# Patient Record
Sex: MALE | Race: WHITE | Employment: OTHER | URBAN - METROPOLITAN AREA
[De-identification: names, ages, dates, MRNs, and addresses within clinical notes are randomized per-mention and may not be internally consistent; named-entity substitution may affect disease eponyms.]

---

## 2017-02-06 ENCOUNTER — OFFICE VISIT (OUTPATIENT)
Dept: FAMILY MEDICINE | Facility: CLINIC | Age: 72
End: 2017-02-06
Payer: COMMERCIAL

## 2017-02-06 VITALS — RESPIRATION RATE: 18 BRPM | BODY MASS INDEX: 28.17 KG/M2 | WEIGHT: 188 LBS | TEMPERATURE: 96.7 F

## 2017-02-06 DIAGNOSIS — K64.4 EXTERNAL HEMORRHOIDS: ICD-10-CM

## 2017-02-06 DIAGNOSIS — R51.9 NONINTRACTABLE HEADACHE, UNSPECIFIED CHRONICITY PATTERN, UNSPECIFIED HEADACHE TYPE: Primary | ICD-10-CM

## 2017-02-06 PROCEDURE — 99214 OFFICE O/P EST MOD 30 MIN: CPT | Performed by: FAMILY MEDICINE

## 2017-02-06 RX ORDER — TADALAFIL 10 MG/1
TABLET ORAL DAILY PRN
COMMUNITY

## 2017-02-06 NOTE — MR AVS SNAPSHOT
After Visit Summary   2/6/2017    Karlos Wood    MRN: [id number]           Patient Information     Date Of Birth          1945        Visit Information        Provider Department      2/6/2017 7:40 AM Terence Thornton MD [facility]        Today's Diagnoses     Nonintractable headache, unspecified chronicity pattern, unspecified headache type    -  1     External hemorrhoids           Care Instructions          Thank you for choosing [facility].  You may be receiving a survey in the mail from [facility] regarding your visit today.  Please take a few minutes to complete and return the survey to let us know how we are doing.      Our Clinic hours are:  Mondays    7:20 am - 7 pm  Tues -  Fri  7:20 am - 5 pm    Clinic Phone: [phone]    The clinic lab opens at 7:30 am Mon - Fri and appointments are required.    [facility]  Ph. [phone]  Monday-Thursday 8 am - 7pm  Tues/Wed/Fri 8 am - 5:30 pm               Follow-ups after your visit        Future tests that were ordered for you today     Open Future Orders        Priority Expected Expires Ordered    CT Sinus w/o Contrast Routine  2/6/2018 2/6/2017            Who to contact     If you have questions or need follow up information about today's clinic visit or your schedule please contact [facility] directly at [phone].  Normal or non-critical lab and imaging results will be communicated to you by MyChart, letter or phone within 4 business days after the clinic has received the results. If you do not hear from us within 7 days, please contact the clinic through MyChart or phone. If you have a critical or abnormal lab result, we will notify you by phone as soon as possible.  Submit refill requests through [employer] or call your pharmacy and they will forward the refill request to us. Please allow 3 business days for your refill to be completed.          Additional Information  "About Your Visit        [employer] Information     Giiv lets you send messages to your doctor, view your test results, renew your prescriptions, schedule appointments and more. To sign up, go to [email or web address] . Click on \"Log in\" on the left side of the screen, which will take you to the Welcome page. Then click on \"Sign up Now\" on the right side of the page.     You will be asked to enter the access code listed below, as well as some personal information. Please follow the directions to create your username and password.     Your access code is: [email or web address]  Expires: 3/7/2017  2:16 PM     Your access code will  in 90 days. If you need help or a new code, please call your [place] clinic or [phone].        Care EveryWhere ID     This is your Care EveryWhere ID. This could be used by other organizations to access your [place] medical records  [id number]        Your Vitals Were     Temperature Respirations                96.7  F (35.9  C) 18           Blood Pressure from Last 3 Encounters:   16 116/75   16 116/71   16 141/83    Weight from Last 3 Encounters:   17 188 lb (85.276 kg)   16 189 lb (85.73 kg)   16 182 lb (82.555 kg)                 Today's Medication Changes          These changes are accurate as of: 17  8:32 AM.  If you have any questions, ask your nurse or doctor.               Start taking these medicines.        Dose/Directions    hydrocortisone 2.5 % cream   Commonly known as:  ANUSOL-HC   Used for:  External hemorrhoids   Started by:  Terence Thornton MD        Place rectally 2 times daily   Quantity:  30 g   Refills:  0         Stop taking these medicines if you haven't already. Please contact your care team if you have questions.     VIAGRA 100 MG cap/tab   Generic drug:  sildenafil   Stopped by:  Terence Thornton MD                Where to get your medicines      These medications were sent to [facility] - " [facility] - [address]  [address], [place] [address]    Hours:  [facility] [place] Phone:  [phone]    - hydrocortisone 2.5 % cream             Primary Care Provider Office Phone # Fax #    Terence Thornton [phone]       [facility] [address]  [facility] [address]        Thank you!     Thank you for choosing [facility]  for your care. Our goal is always to provide you with excellent care. Hearing back from our patients is one way we can continue to improve our services. Please take a few minutes to complete the written survey that you may receive in the mail after your visit with us. Thank you!             Your Updated Medication List - Protect others around you: Learn how to safely use, store and throw away your medicines at www.disposemymeds.org.          This list is accurate as of: 2/6/17  8:32 AM.  Always use your most recent med list.                   Brand Name Dispense Instructions for use    BENADRYL 25 MG tablet   Generic drug:  diphenhydrAMINE     56    1 TABLET EVERY 4 TO 6 HOURS AS NEEDED       CIALIS 10 MG tablet   Generic drug:  tadalafil      Take by mouth daily as needed for erectile dysfunction       diflorasone 0.05 % ointment    PSORCON    30 g    Apply to Leg eczema once daily as needed.       hydrocortisone 2.5 % cream    ANUSOL-HC    30 g    Place rectally 2 times daily       IBUPROFEN PO      Take 400 mg by mouth every 6 hours as needed for moderate pain       MICONAZOLE      applying to toes PRN       simvastatin 20 MG tablet    ZOCOR    90 tablet    Take 1 tablet (20 mg) by mouth At Bedtime       TYLENOL PO      Take 500 mg by mouth

## 2017-02-06 NOTE — PATIENT INSTRUCTIONS
Thank you for choosing [facility].  You may be receiving a survey in the mail from [facility] regarding your visit today.  Please take a few minutes to complete and return the survey to let us know how we are doing.      Our Clinic hours are:  Mondays    7:20 am - 7 pm  Tues -  Fri  7:20 am - 5 pm    Clinic Phone: [phone]    The clinic lab opens at 7:30 am Mon - Fri and appointments are required.    [facility]. [phone]  Monday-Thursday 8 am - 7pm  Tues/Wed/Fri 8 am - 5:30 pm

## 2017-02-06 NOTE — PROGRESS NOTES
SUBJECTIVE:                                                    Karlos Wood is a 71 year old male who presents to clinic today for the following health issues:      Headache     Onset: 12/16     Description:   Location: bilateral in the frontal area, bilateral in the temporal area   Character: throbbing pain  Frequency:  Daily   Duration:  All day     Intensity: mild    Progression of Symptoms:  same    Accompanying Signs & Symptoms:  Stiff neck: no  Neck or upper back pain: no  Fever: no  Sinus pressure: YES  Nausea or vomiting: no  Dizziness: no  Numbness: no  Weakness: no  Visual changes: no   History:   Head trauma: no  Family history of migraines: no  Previous tests for headaches: no  Neurologist evaluations: no  Able to do daily activities: YES  Wake with a headaches: no  Do headaches wake you up: no  Daily pain medication use: YES  Work/school stressors/changes: YES- selling house     Precipitating factors:   Does light make it worse: no  Does sound make it worse: no    Alleviating factors:  Does sleep help: YES         Therapies Tried and outcome: Naproxyn (Aleve), Tylenol and narcotics ( codeine )- helps -  Sinus medication did not help           Problem list and histories reviewed & adjusted, as indicated.  Additional history: as documented    Further history obtained, clarified or corrected by physician:  He continues to have frontal headaches that have varied in quality and severity but really have not disappeared over the last 2 months. There are times he thinks this is sinus infection because there is congestion but other times is just sharp frontal headaches. He has been treated for sinusitis without much relief.    OBJECTIVE:  HEAD: AT/NC  EYES: PERRLA, EOMI, Sclerae clear, Fundi normal with sharp discs  EARS: TMs clear, canals normal  NOSE & THROAT: clear  NEURO: Alert and oriented X 3, non focal exam, DTRs normal, motor and sensation normal, coordination and gait without  abnormality.    ASSESSMENT:     Nonintractable headache, unspecified chronicity pattern, unspecified headache type  External hemorrhoids    PLAN:  Orders Placed This Encounter     CT Sinus w/o Contrast     tadalafil (CIALIS) 10 MG tablet     hydrocortisone (ANUSOL-HC) 2.5 % cream     CT scan of the sinuses, if this is negative then he may need MRI scan of the brain and possibly neurology consultation.

## 2017-02-07 ENCOUNTER — TELEPHONE (OUTPATIENT)
Dept: FAMILY MEDICINE | Facility: CLINIC | Age: 72
End: 2017-02-07

## 2017-02-07 ENCOUNTER — HOSPITAL ENCOUNTER (OUTPATIENT)
Dept: CT IMAGING | Facility: CLINIC | Age: 72
Discharge: HOME | End: 2017-02-07
Attending: FAMILY MEDICINE | Admitting: FAMILY MEDICINE
Payer: MEDICARE

## 2017-02-07 DIAGNOSIS — R51.9 NONINTRACTABLE HEADACHE, UNSPECIFIED CHRONICITY PATTERN, UNSPECIFIED HEADACHE TYPE: ICD-10-CM

## 2017-02-07 DIAGNOSIS — G44.209 TENSION-TYPE HEADACHE, NOT INTRACTABLE, UNSPECIFIED CHRONICITY PATTERN: Primary | ICD-10-CM

## 2017-02-07 PROCEDURE — 70486 CT MAXILLOFACIAL W/O DYE: CPT

## 2017-02-07 NOTE — TELEPHONE ENCOUNTER
Patient calling needing the order for his MRI to be faxed. Order faxed to [phone].    [facility]

## 2017-02-16 ENCOUNTER — OFFICE VISIT (OUTPATIENT)
Dept: FAMILY MEDICINE | Facility: CLINIC | Age: 72
End: 2017-02-16
Payer: COMMERCIAL

## 2017-02-16 VITALS
SYSTOLIC BLOOD PRESSURE: 138 MMHG | WEIGHT: 188 LBS | DIASTOLIC BLOOD PRESSURE: 82 MMHG | HEIGHT: 69 IN | BODY MASS INDEX: 27.85 KG/M2 | HEART RATE: 62 BPM | TEMPERATURE: 97 F

## 2017-02-16 DIAGNOSIS — J34.89 SINUS DRAINAGE: ICD-10-CM

## 2017-02-16 DIAGNOSIS — R50.81 FEVER IN OTHER DISEASES: ICD-10-CM

## 2017-02-16 DIAGNOSIS — R51.9 DAILY HEADACHE: ICD-10-CM

## 2017-02-16 DIAGNOSIS — J10.1 INFLUENZA A: Primary | ICD-10-CM

## 2017-02-16 LAB
FLUAV+FLUBV AG SPEC QL: ABNORMAL
FLUAV+FLUBV AG SPEC QL: ABNORMAL
SPECIMEN SOURCE: ABNORMAL

## 2017-02-16 PROCEDURE — 87804 INFLUENZA ASSAY W/OPTIC: CPT | Performed by: FAMILY MEDICINE

## 2017-02-16 PROCEDURE — 99213 OFFICE O/P EST LOW 20 MIN: CPT | Performed by: FAMILY MEDICINE

## 2017-02-16 RX ORDER — FLUTICASONE PROPIONATE 50 MCG
1-2 SPRAY, SUSPENSION (ML) NASAL DAILY
Qty: 1 BOTTLE | Refills: 0 | Status: SHIPPED | OUTPATIENT
Start: 2017-02-16 | End: 2018-02-08

## 2017-02-16 NOTE — MR AVS SNAPSHOT
"              After Visit Summary   2017    Karlos Wood    MRN: [id number]           Patient Information     Date Of Birth          1945        Visit Information        Provider Department      2017 11:20 AM Airam Pena MD [facility]        Today's Diagnoses     Fever in other diseases    -  1    Sinus drainage        Daily headache           Follow-ups after your visit        Who to contact     If you have questions or need follow up information about today's clinic visit or your schedule please contact [facility] directly at [phone].  Normal or non-critical lab and imaging results will be communicated to you by [employer], letter or phone within 4 business days after the clinic has received the results. If you do not hear from us within 7 days, please contact the clinic through [employer] or phone. If you have a critical or abnormal lab result, we will notify you by phone as soon as possible.  Submit refill requests through [employer] or call your pharmacy and they will forward the refill request to us. Please allow 3 business days for your refill to be completed.          Additional Information About Your Visit        MyChart Information     [employer] lets you send messages to your doctor, view your test results, renew your prescriptions, schedule appointments and more. To sign up, go to [email or web address] . Click on \"Log in\" on the left side of the screen, which will take you to the Welcome page. Then click on \"Sign up Now\" on the right side of the page.     You will be asked to enter the access code listed below, as well as some personal information. Please follow the directions to create your username and password.     Your access code is: [email or web address]  Expires: 3/7/2017  2:16 PM     Your access code will  in 90 days. If you need help or a new code, please call your [facility] or [phone].        Care EveryWhere ID     This is " "your Care EveryWhere ID. This could be used by other organizations to access your [place] medical records  [id number]        Your Vitals Were     Pulse Temperature Height BMI (Body Mass Index)          62 97  F (36.1  C) (Tympanic) 5' 8.5\" (1.74 m) 28.17 kg/m2         Blood Pressure from Last 3 Encounters:   02/16/17 138/82   12/07/16 116/75   07/19/16 116/71    Weight from Last 3 Encounters:   02/16/17 188 lb (85.3 kg)   02/06/17 188 lb (85.3 kg)   12/07/16 189 lb (85.7 kg)              We Performed the Following     Influenza A/B antigen          Today's Medication Changes          These changes are accurate as of: 2/16/17 11:56 AM.  If you have any questions, ask your nurse or doctor.               Start taking these medicines.        Dose/Directions    fluticasone 50 MCG/ACT spray   Commonly known as:  FLONASE   Used for:  Sinus drainage   Started by:  Airam Pena MD        Dose:  1-2 spray   Spray 1-2 sprays into both nostrils daily   Quantity:  1 Bottle   Refills:  0            Where to get your medicines      These medications were sent to [place] [facility] - [facility] - [address]  [address], [place] [address]    Hours:  [place] [facility] [facility] Phone:  [phone]     fluticasone 50 MCG/ACT spray                Primary Care Provider Office Phone # Fax #    Terence Thornton [phone]       [facility] [address] [facility] [address]        Thank you!     Thank you for choosing [facility]  for your care. Our goal is always to provide you with excellent care. Hearing back from our patients is one way we can continue to improve our services. Please take a few minutes to complete the written survey that you may receive in the mail after your visit with us. Thank you!             Your Updated Medication List - Protect others around you: Learn how to safely use, store and throw away your medicines at " www.disposemymeds.org.          This list is accurate as of: 2/16/17 11:56 AM.  Always use your most recent med list.                   Brand Name Dispense Instructions for use    BENADRYL 25 MG tablet   Generic drug:  diphenhydrAMINE     56    1 TABLET EVERY 4 TO 6 HOURS AS NEEDED       CIALIS 10 MG tablet   Generic drug:  tadalafil      Take by mouth daily as needed for erectile dysfunction       diflorasone 0.05 % ointment    PSORCON    30 g    Apply to Leg eczema once daily as needed.       fluticasone 50 MCG/ACT spray    FLONASE    1 Bottle    Spray 1-2 sprays into both nostrils daily       hydrocortisone 2.5 % cream    ANUSOL-HC    30 g    Place rectally 2 times daily       IBUPROFEN PO      Take 400 mg by mouth every 6 hours as needed for moderate pain       MICONAZOLE      applying to toes PRN       simvastatin 20 MG tablet    ZOCOR    90 tablet    Take 1 tablet (20 mg) by mouth At Bedtime       TYLENOL PO      Take 500 mg by mouth

## 2017-02-16 NOTE — NURSING NOTE
"Chief Complaint   Patient presents with     URI       Initial /82  Pulse 62  Temp 97  F (36.1  C) (Tympanic)  Ht 5' 8.5\" (1.74 m)  Wt 188 lb (85.3 kg)  BMI 28.17 kg/m2 Estimated body mass index is 28.17 kg/(m^2) as calculated from the following:    Height as of this encounter: 5' 8.5\" (1.74 m).    Weight as of this encounter: 188 lb (85.3 kg).  Medication Reconciliation: complete    "

## 2017-02-16 NOTE — PROGRESS NOTES
"  SUBJECTIVE:                                                    Karlos Wood is a 71 year old male who presents to clinic today for the following health issues:      ENT Symptoms             Symptoms: cc Present Absent Comment   Fever/Chills x x  102.7 on Sunday   Fatigue  x     Muscle Aches  x     Eye Irritation   x    Sneezing   x    Nasal Demario/Drg  x  A lot of drainage   Sinus Pressure/Pain x x     Loss of smell   x    Dental pain   x    Sore Throat   x    Swollen Glands   x    Ear Pain/Fullness   x    Cough  x  Productive at times   Wheeze   x    Chest Pain   x    Shortness of breath   x    Rash   x    Other  x  Intermittent dizziness atimes     Symptom duration:  5-6 days   Symptom severity:  moderate   Treatments tried:  OTC cold RX   Contacts:  None       Karlos Wood is a 71 year old male who has had a history of headaches, often in his forehead, other times in the back of his head, usually aching but sometimes sharp and lancing.  He has also had some chronic rhinitis and has been treated with antibiotics for sinusitis. Due to persistent pain he had a sinus CT which did not find anything of significance, and has been scheduled now for a MRI which he postponed due to his current illness.    About five days ago he developed a fever with chills, myalgias, and sore throat, and a couple days later got the cough he now complains of.  The fever has resolved. He is worried about sinus problems again.    He has been under more stress recently because he and his wife have sold their home, and are looking for a smaller home, but have been having difficulty finding what they want.    He admits to frequent use of Tylenol or ibuprofen for his headaches.. He has felt lightheaded with the current acute illness, but denies any other neurological symptoms, denies GI problems.    OBJECTIVE: /82  Pulse 62  Temp 97  F (36.1  C) (Tympanic)  Ht 5' 8.5\" (1.74 m)  Wt 188 lb (85.3 kg)  BMI 28.17 kg/m2    TMs are " without inflammation but appear slightly retracted.  Nose is clear.  Maxillary and frontal sinuses are nontender.  Throat is normal.  Neck is without adenopathy.  The lungs are clear without wheezes, rales, or rhonchi.    Results for orders placed or performed in visit on 02/16/17   Influenza A/B antigen   Result Value Ref Range    Influenza A/B Agn Specimen Nasopharyngeal     Influenza A (A) NEG     Positive   Test results must be correlated with clinical data. If necessary, results   should be confirmed by a molecular assay or viral culture.      Influenza B  NEG     Negative   Test results must be correlated with clinical data. If necessary, results   should be confirmed by a molecular assay or viral culture.       ASSESSMENT: influenza A       Chronic daily headache, possible combination of tension headache plus analgesic rebound headache    PLAN: It is too late for Tamiflu to be of benefit. He did have a flu shot this year, but may have been more ill if he had not have that.   He may continue Mucinex and OTC cough meds.   He was given patient information on chronic daily headache from Up to Date. The recommendation is not to use Tylenol more than twice a week or ibuprofen more than 15 days in a month.  He already has a MRI scheduled through his PCP so he may pursue that.   Push fluids and rest.    Airam Pena md

## 2017-02-20 ENCOUNTER — TELEPHONE (OUTPATIENT)
Dept: FAMILY MEDICINE | Facility: CLINIC | Age: 72
End: 2017-02-20

## 2017-02-20 NOTE — TELEPHONE ENCOUNTER
Discussed MRI test and that results would be discussed with pt/her.  Results would lead to answers and/or further testing.  [email or web address]

## 2017-02-21 ENCOUNTER — TRANSFERRED RECORDS (OUTPATIENT)
Dept: HEALTH INFORMATION MANAGEMENT | Facility: CLINIC | Age: 72
End: 2017-02-21

## 2017-02-24 ENCOUNTER — TELEPHONE (OUTPATIENT)
Dept: FAMILY MEDICINE | Facility: CLINIC | Age: 72
End: 2017-02-24

## 2017-02-24 NOTE — TELEPHONE ENCOUNTER
Reason for Call:  Request for results:    Name of test or procedure: MRI     Date of test of procedure: 2/22/17    Location of the test or procedure: [facility]     OK to leave the result message on voice mail or with a family member? YES    Phone number Patient can be reached at:  Other phone number:  [phone]    Additional comments: pt states he has requested the report be faxed to PCP so he can get results and recommendations   States they have been sent to clinic twice     Call taken on 2/24/2017 at 11:10 AM by Yareli Manning

## 2017-05-08 ENCOUNTER — TELEPHONE (OUTPATIENT)
Dept: FAMILY MEDICINE | Facility: CLINIC | Age: 72
End: 2017-05-08

## 2017-05-08 DIAGNOSIS — E78.5 HYPERLIPIDEMIA LDL GOAL <130: Primary | ICD-10-CM

## 2017-05-08 NOTE — TELEPHONE ENCOUNTER
Reason for Call: Request for an order or referral:    Order or referral being requested: Pt made an appt @ [facility] for fasting, yearly labs and orders need to be placed.  No need to call patient back, unless there are questions or problems.      Date needed: at your convenience    Has the patient been seen by the PCP for this problem? YES    Additional comments: any    Phone number Patient can be reached at:  Home number on file [phone] (home)    Best Time:  any    Can we leave a detailed message on this number?  YES    Call taken on 5/8/2017 at 9:04 AM by Shirlene Mayes

## 2017-05-09 DIAGNOSIS — E78.5 HYPERLIPIDEMIA LDL GOAL <130: ICD-10-CM

## 2017-05-09 LAB
CHOLEST SERPL-MCNC: 183 MG/DL
HDLC SERPL-MCNC: 61 MG/DL
LDLC SERPL CALC-MCNC: 105 MG/DL
NONHDLC SERPL-MCNC: 122 MG/DL
TRIGL SERPL-MCNC: 86 MG/DL

## 2017-05-09 PROCEDURE — 36415 COLL VENOUS BLD VENIPUNCTURE: CPT | Performed by: FAMILY MEDICINE

## 2017-05-09 PROCEDURE — 80061 LIPID PANEL: CPT | Performed by: FAMILY MEDICINE

## 2017-05-10 ENCOUNTER — OFFICE VISIT (OUTPATIENT)
Dept: FAMILY MEDICINE | Facility: CLINIC | Age: 72
End: 2017-05-10
Payer: COMMERCIAL

## 2017-05-10 VITALS
SYSTOLIC BLOOD PRESSURE: 136 MMHG | WEIGHT: 183 LBS | RESPIRATION RATE: 18 BRPM | HEIGHT: 68 IN | BODY MASS INDEX: 27.74 KG/M2 | TEMPERATURE: 97.3 F | HEART RATE: 59 BPM | DIASTOLIC BLOOD PRESSURE: 75 MMHG

## 2017-05-10 DIAGNOSIS — E78.5 HYPERLIPIDEMIA LDL GOAL <130: ICD-10-CM

## 2017-05-10 DIAGNOSIS — Z00.00 ROUTINE HISTORY AND PHYSICAL EXAMINATION OF ADULT: Primary | ICD-10-CM

## 2017-05-10 PROCEDURE — 99397 PER PM REEVAL EST PAT 65+ YR: CPT | Performed by: FAMILY MEDICINE

## 2017-05-10 RX ORDER — SIMVASTATIN 20 MG
20 TABLET ORAL AT BEDTIME
Qty: 90 TABLET | Refills: 3 | Status: SHIPPED | OUTPATIENT
Start: 2017-05-10 | End: 2018-03-19

## 2017-05-10 NOTE — PROGRESS NOTES
SUBJECTIVE:                                                            Karlos Wood is a 71 year old male who presents for Preventive Visit.      Are you in the first 12 months of your Medicare Part B coverage?  No    Healthy Habits:    Do you get at least three servings of calcium containing foods daily (dairy, green leafy vegetables, etc.)? yes    Amount of exercise or daily activities, outside of work: 3 day(s) per week    Problems taking medications regularly No    Medication side effects: No    Have you had an eye exam in the past two years? yes    Do you see a dentist twice per year? yes    Do you have sleep apnea, excessive snoring or daytime drowsiness?no    COGNITIVE SCREEN  1) Repeat 3 items (Banana, Sunrise, Chair)    2) Clock draw: NORMAL  3) 3 item recall: Recalls 3 objects  Results: 3 items recalled: COGNITIVE IMPAIRMENT LESS LIKELY    Mini-CogTM Copyright S Alexis. Licensed by the author for use in [facility]; reprinted with permission (hoa@[facility]). All rights reserved.                Reviewed and updated as needed this visit by clinical staff  Tobacco  Allergies  Meds         Reviewed and updated as needed this visit by Provider        Social History   Substance Use Topics     Smoking status: Former Smoker     Smokeless tobacco: Never Used     Alcohol use Yes      Comment: 1-2 roberto every other week       The patient does not drink >3 drinks per day nor >7 drinks per week.    Today's PHQ-2 Score:   PHQ-2 ( 1999 Pf[employer]) 5/10/2017 12/7/2016   Q1: Little interest or pleasure in doing things 0 0   Q2: Feeling down, depressed or hopeless 0 0   PHQ-2 Score 0 0       Do you feel safe in your environment - Yes    Do you have a Health Care Directive?: Yes: Advance Directive has been received and scanned.    Current providers sharing in care for this patient include:   Patient Care Team:  Terence Thornton MD as PCP - General      Hearing impairment: No    Ability to successfully perform  "activities of daily living: Yes, no assistance needed     Fall risk:  Fallen 2 or more times in the past year?: No  Any fall with injury in the past year?: No    Home safety:  none identified      The following health maintenance items are reviewed in Epic and correct as of today:  Health Maintenance   Topic Date Due     AORTIC ANEURYSM SCREENING (SYSTEM ASSIGNED)  11/28/2008     FALL RISK ASSESSMENT  04/18/2017     INFLUENZA VACCINE (SYSTEM ASSIGNED)  09/01/2017     ADVANCE DIRECTIVE PLANNING Q5 YRS (NO INBASKET)  09/25/2017     COLONOSCOPY Q5 YR INBASKET MESSAGE  09/23/2019     LIPID SCREEN Q5 YR MALE (SYSTEM ASSIGNED)  05/09/2022     TETANUS IMMUNIZATION (SYSTEM ASSIGNED)  07/17/2023     PNEUMOCOCCAL  Completed              ROS:  Constitutional, HEENT, cardiovascular, pulmonary, GI, , musculoskeletal, neuro, skin, endocrine and psych systems are negative, except as otherwise noted.    Problem list, Medication list, Allergies, and Medical/Social/Surgical histories reviewed in [facility] and updated as appropriate.  OBJECTIVE:                                                            /75  Pulse 59  Temp 97.3  F (36.3  C)  Resp 18  Ht 5' 8.25\" (1.734 m)  Wt 183 lb (83 kg)  BMI 27.62 kg/m2 Estimated body mass index is 27.62 kg/(m^2) as calculated from the following:    Height as of this encounter: 5' 8.25\" (1.734 m).    Weight as of this encounter: 183 lb (83 kg).  EXAM:   GENERAL: healthy, alert and no distress  EYES: Eyes grossly normal to inspection, PERRL and conjunctivae and sclerae normal  HENT: ear canals and TM's normal, nose and mouth without ulcers or lesions  NECK: no adenopathy, no asymmetry, masses, or scars and thyroid normal to palpation  RESP: lungs clear to auscultation - no rales, rhonchi or wheezes  CV: regular rate and rhythm, normal S1 S2, no S3 or S4, no murmur, click or rub, no peripheral edema and peripheral pulses strong  ABDOMEN: soft, nontender, no hepatosplenomegaly, no masses and " "bowel sounds normal  MS: no gross musculoskeletal defects noted, no edema  SKIN: no suspicious lesions or rashes  NEURO: Normal strength and tone, mentation intact and speech normal  PSYCH: mentation appears normal, affect normal/bright    ASSESSMENT / PLAN:                                                            Karlos was seen today for physical.    Diagnoses and all orders for this visit:    Routine history and physical examination of adult    Hyperlipidemia LDL goal <130  -     simvastatin (ZOCOR) 20 MG tablet; Take 1 tablet (20 mg) by mouth At Bedtime        End of Life Planning:  Patient currently has an advanced directive:     COUNSELING:  Reviewed preventive health counseling, as reflected in patient instructions       Regular exercise       Healthy diet/nutrition        Estimated body mass index is 27.62 kg/(m^2) as calculated from the following:    Height as of this encounter: 5' 8.25\" (1.734 m).    Weight as of this encounter: 183 lb (83 kg).     reports that he has quit smoking. He has never used smokeless tobacco.      Appropriate preventive services were discussed with this patient, including applicable screening as appropriate for cardiovascular disease, diabetes, osteopenia/osteoporosis, and glaucoma.  As appropriate for age/gender, discussed screening for colorectal cancer, prostate cancer, breast cancer, and cervical cancer. Checklist reviewing preventive services available has been given to the patient.    Reviewed patients plan of care and provided an AVS. The Basic Care Plan (routine screening as documented in Health Maintenance) for Karlos meets the Care Plan requirement. This Care Plan has been established and reviewed with the Patient.    Counseling Resources:  ATP IV Guidelines  Pooled Cohorts Equation Calculator  Breast Cancer Risk Calculator  FRAX Risk Assessment  ICSI Preventive Guidelines  Dietary Guidelines for Americans, 2010  USDA's MyPlate  ASA Prophylaxis  Lung CA " Screening    Terence Thornton MD  [facility]

## 2017-05-10 NOTE — MR AVS SNAPSHOT
After Visit Summary   5/10/2017    Karlos Wood    MRN: [id number]           Patient Information     Date Of Birth          1945        Visit Information        Provider Department      5/10/2017 2:20 PM Terence Thornton MD [facility] [place]        Today's Diagnoses     Routine history and physical examination of adult    -  1    Hyperlipidemia LDL goal <130          Care Instructions      Preventive Health Recommendations:       Male Ages 65 and over    Yearly exam:             See your health care provider every year in order to  o   Review health changes.   o   Discuss preventive care.    o   Review your medicines if your doctor has prescribed any.    Talk with your health care provider about whether you should have a test to screen for prostate cancer (PSA).    Every 3 years, have a diabetes test (fasting glucose). If you are at risk for diabetes, you should have this test more often.    Every 5 years, have a cholesterol test. Have this test more often if you are at risk for high cholesterol or heart disease.     Every 10 years, have a colonoscopy. Or, have a yearly FIT test (stool test). These exams will check for colon cancer.    Talk to with your health care provider about screening for Abdominal Aortic Aneurysm if you have a family history of AAA or have a history of smoking.  Shots:     Get a flu shot each year.     Get a tetanus shot every 10 years.     Talk to your doctor about your pneumonia vaccines. There are now two you should receive - Pneumovax (PPSV 23) and Prevnar (PCV 13).    Talk to your doctor about a shingles vaccine.     Talk to your doctor about the hepatitis B vaccine.  Nutrition:     Eat at least 5 servings of fruits and vegetables each day.     Eat whole-grain bread, whole-wheat pasta and brown rice instead of white grains and rice.     Talk to your doctor about Calcium and Vitamin D.   Lifestyle    Exercise for at least 150 minutes a week (30 minutes a  "day, 5 days a week). This will help you control your weight and prevent disease.     Limit alcohol to one drink per day.     No smoking.     Wear sunscreen to prevent skin cancer.     See your dentist every six months for an exam and cleaning.     See your eye doctor every 1 to 2 years to screen for conditions such as glaucoma, macular degeneration and cataracts.        Follow-ups after your visit        Who to contact     If you have questions or need follow up information about today's clinic visit or your schedule please contact [facility] directly at [phone].  Normal or non-critical lab and imaging results will be communicated to you by [employer], letter or phone within 4 business days after the clinic has received the results. If you do not hear from us within 7 days, please contact the clinic through [facility] or phone. If you have a critical or abnormal lab result, we will notify you by phone as soon as possible.  Submit refill requests through [employer] or call your pharmacy and they will forward the refill request to us. Please allow 3 business days for your refill to be completed.          Additional Information About Your Visit        MyChart Information     [employer] lets you send messages to your doctor, view your test results, renew your prescriptions, schedule appointments and more. To sign up, go to [email or web address] . Click on \"Log in\" on the left side of the screen, which will take you to the Welcome page. Then click on \"Sign up Now\" on the right side of the page.     You will be asked to enter the access code listed below, as well as some personal information. Please follow the directions to create your username and password.     Your access code is: [email or web address]  Expires: 2017  2:45 PM     Your access code will  in 90 days. If you need help or a new code, please call your [facility] or [phone].        Care EveryWhere ID     This is your Care EveryWhere " "ID. This could be used by other organizations to access your [facility] medical records  [id number]        Your Vitals Were     Pulse Temperature Respirations Height BMI (Body Mass Index)       59 97.3  F (36.3  C) 18 5' 8.25\" (1.734 m) 27.62 kg/m2        Blood Pressure from Last 3 Encounters:   05/10/17 136/75   02/16/17 138/82   12/07/16 116/75    Weight from Last 3 Encounters:   05/10/17 183 lb (83 kg)   02/16/17 188 lb (85.3 kg)   02/06/17 188 lb (85.3 kg)              Today, you had the following     No orders found for display         Where to get your medicines      [facility] [place] [id number]  [address], [place] [address]    Hours:  MARY BETH L[facility]pez [facility] Phone:  [phone]     simvastatin 20 MG tablet          Primary Care Provider Office Phone # Fax #    Terence Thornton [phone]       [facility] [address] [facility] [address]        Thank you!     Thank you for choosing [facility]  for your care. Our goal is always to provide you with excellent care. Hearing back from our patients is one way we can continue to improve our services. Please take a few minutes to complete the written survey that you may receive in the mail after your visit with us. Thank you!             Your Updated Medication List - Protect others around you: Learn how to safely use, store and throw away your medicines at www.disposemymeds.org.          This list is accurate as of: 5/10/17  2:51 PM.  Always use your most recent med list.                   Brand Name Dispense Instructions for use    BENADRYL 25 MG tablet   Generic drug:  diphenhydrAMINE     56    1 TABLET EVERY 4 TO 6 HOURS AS NEEDED       CIALIS 10 MG tablet   Generic drug:  tadalafil      Take by mouth daily as needed for erectile dysfunction       diflorasone 0.05 % ointment    PSORCON    30 g    Apply to Leg eczema once daily " as needed.       fluticasone 50 MCG/ACT spray    FLONASE    1 Bottle    Spray 1-2 sprays into both nostrils daily       hydrocortisone 2.5 % cream    ANUSOL-HC    30 g    Place rectally 2 times daily       IBUPROFEN PO      Take 400 mg by mouth every 6 hours as needed for moderate pain       MICONAZOLE      applying to toes PRN       simvastatin 20 MG tablet    ZOCOR    90 tablet    Take 1 tablet (20 mg) by mouth At Bedtime       TYLENOL PO      Take 500 mg by mouth

## 2017-06-05 ENCOUNTER — OFFICE VISIT (OUTPATIENT)
Dept: FAMILY MEDICINE | Facility: CLINIC | Age: 72
End: 2017-06-05
Payer: COMMERCIAL

## 2017-06-05 ENCOUNTER — RADIANT APPOINTMENT (OUTPATIENT)
Dept: GENERAL RADIOLOGY | Facility: CLINIC | Age: 72
End: 2017-06-05
Attending: NURSE PRACTITIONER
Payer: COMMERCIAL

## 2017-06-05 VITALS
OXYGEN SATURATION: 96 % | TEMPERATURE: 98 F | WEIGHT: 184 LBS | HEART RATE: 69 BPM | RESPIRATION RATE: 20 BRPM | SYSTOLIC BLOOD PRESSURE: 128 MMHG | DIASTOLIC BLOOD PRESSURE: 72 MMHG | BODY MASS INDEX: 27.77 KG/M2

## 2017-06-05 DIAGNOSIS — R05.9 COUGH: ICD-10-CM

## 2017-06-05 DIAGNOSIS — B34.9 VIRAL SYNDROME: Primary | ICD-10-CM

## 2017-06-05 PROCEDURE — 71020 XR CHEST 2 VW: CPT

## 2017-06-05 PROCEDURE — 99213 OFFICE O/P EST LOW 20 MIN: CPT | Performed by: NURSE PRACTITIONER

## 2017-06-05 RX ORDER — BENZONATATE 200 MG/1
200 CAPSULE ORAL 3 TIMES DAILY PRN
Qty: 30 CAPSULE | Refills: 0 | Status: SHIPPED | OUTPATIENT
Start: 2017-06-05 | End: 2018-02-08

## 2017-06-05 NOTE — PATIENT INSTRUCTIONS
"Increase rest and fluids. Tylenol and/or Ibuprofen for comfort. Cool mist vaporizer. If your symptoms worsen or do not resolve follow up with your primary care provider in 1 week and sooner if needed.     Mucinex 600 mg 12 hr formula can also help with head and chest congestion and post nasal drip.     Indications for emergent return to emergency department discussed with patient, who verbalized good understanding and agreement.  Patient understands the limitations of today's evaluation.           Viral Syndrome (Adult)  A viral illness may cause a number of symptoms. The symptoms depend on the part of the body that the virus affects. If it settles in the nose, throat, and lungs, it may cause cough, sore throat, congestion, and sometimes headache. If it settles in the stomach and intestinal tract, it may cause vomiting and diarrhea. Sometimes it causes vague symptoms like \"aching all over,\" feeling tired, loss of appetite, or fever.  A viral illness usually lasts 1 to 2 weeks, but sometimes it lasts longer. In some cases, a more serious infection can look like a viral syndrome in the first few days of the illness. You may need another exam and additional tests to know the difference. Watch for the warning signs listed below.  Home care  Follow these guidelines for taking care of yourself at home:    If symptoms are severe, rest at home for the first 2 to 3 days.    Stay away from cigarette smoke - both your smoke and the smoke from others.    You may use over-the-counter medication acetaminophen or ibuprofen for fever, muscle aching, and headache, unless another medicine was prescribed for this. If you have chronic liver or kidney disease or ever had a stomach ulcer or GI bleeding, talk with your doctor before using these medicines . No one who is younger than 18 and ill with a fever should take aspirin. It may cause severe disease or death liver damage .    Your appetite may be poor, so a light diet is fine. Avoid " dehydration by drinking 8 to 12 8-ounce glasses of fluids each day. This may include water; orange juice; lemonade; apple, grape, and cranberry juice; clear fruit drinks; electrolyte replacement and sports drinks; and decaffeinated teas and coffee. If you have been diagnosed with a kidney disease, ask your doctor how much and what types of fluids you should drink to prevent dehydration. If you have kidney disease, drinking too much fluid can cause it build up in the your body and be dangerous to your health.    Over-the-counter remedies won't shorten the length of the illness but may be helpful for cough, sore throat; and nasal and sinus congestion. Don't use decongestants if you have high blood pressure.  Follow-up care  Follow up with your health care provider if you do not improve over the next week.  When to seek medical advice  Call your healthcare provider right away if any of these occur:    Cough with lots of colored sputum (mucus) or blood in your sputum    Chest pain, shortness of breath, wheezing, or difficulty breathing    Severe headache; face, neck, or ear pain    Severe, constant pain in the lower right side of your belly (abdominal)    Continued vomiting (can t keep liquids down)    Frequent diarrhea (more than 5 times a day); blood (red or black color) or mucus in diarrhea    Feeling weak, dizzy, or like you are going to faint    Extreme thirst    Fever of 100.4 F (38 C) or higher, or as directed by your healthcare provider  Call 911  Get emergency medical care if any of the following occur:    Convulsion    Feeling weak, dizzy, or like you are going to faint    Chest pain, shortness of breath, wheezing, or difficulty breathing    [id number] [employer]. [address] [place] [address]. All rights reserved. This information is not intended as a substitute for professional medical care. Always follow your healthcare professional's instructions.

## 2017-06-05 NOTE — NURSING NOTE
"Chief Complaint   Patient presents with     Cough       Initial There were no vitals taken for this visit. Estimated body mass index is 27.62 kg/(m^2) as calculated from the following:    Height as of 5/10/17: 5' 8.25\" (1.734 m).    Weight as of 5/10/17: 183 lb (83 kg).  Medication Reconciliation: complete    Health Maintenance that is potentially due pending provider  none          "

## 2017-06-05 NOTE — MR AVS SNAPSHOT
"              After Visit Summary   6/5/2017    Karlos Wood    MRN: [id number]           Patient Information     Date Of Birth          1945        Visit Information        Provider Department      6/5/2017 12:40 PM Isabella Bagley APRN [facility]        Today's Diagnoses     Viral syndrome    -  1    Cough          Care Instructions    Increase rest and fluids. Tylenol and/or Ibuprofen for comfort. Cool mist vaporizer. If your symptoms worsen or do not resolve follow up with your primary care provider in 1 week and sooner if needed.     Mucinex 600 mg 12 hr formula can also help with head and chest congestion and post nasal drip.     Indications for emergent return to emergency department discussed with patient, who verbalized good understanding and agreement.  Patient understands the limitations of today's evaluation.           Viral Syndrome (Adult)  A viral illness may cause a number of symptoms. The symptoms depend on the part of the body that the virus affects. If it settles in the nose, throat, and lungs, it may cause cough, sore throat, congestion, and sometimes headache. If it settles in the stomach and intestinal tract, it may cause vomiting and diarrhea. Sometimes it causes vague symptoms like \"aching all over,\" feeling tired, loss of appetite, or fever.  A viral illness usually lasts 1 to 2 weeks, but sometimes it lasts longer. In some cases, a more serious infection can look like a viral syndrome in the first few days of the illness. You may need another exam and additional tests to know the difference. Watch for the warning signs listed below.  Home care  Follow these guidelines for taking care of yourself at home:    If symptoms are severe, rest at home for the first 2 to 3 days.    Stay away from cigarette smoke - both your smoke and the smoke from others.    You may use over-the-counter medication acetaminophen or ibuprofen for fever, muscle aching, and " headache, unless another medicine was prescribed for this. If you have chronic liver or kidney disease or ever had a stomach ulcer or GI bleeding, talk with your doctor before using these medicines . No one who is younger than 18 and ill with a fever should take aspirin. It may cause severe disease or death liver damage .    Your appetite may be poor, so a light diet is fine. Avoid dehydration by drinking 8 to 12 8-ounce glasses of fluids each day. This may include water; orange juice; lemonade; apple, grape, and cranberry juice; clear fruit drinks; electrolyte replacement and sports drinks; and decaffeinated teas and coffee. If you have been diagnosed with a kidney disease, ask your doctor how much and what types of fluids you should drink to prevent dehydration. If you have kidney disease, drinking too much fluid can cause it build up in the your body and be dangerous to your health.    Over-the-counter remedies won't shorten the length of the illness but may be helpful for cough, sore throat; and nasal and sinus congestion. Don't use decongestants if you have high blood pressure.  Follow-up care  Follow up with your health care provider if you do not improve over the next week.  When to seek medical advice  Call your healthcare provider right away if any of these occur:    Cough with lots of colored sputum (mucus) or blood in your sputum    Chest pain, shortness of breath, wheezing, or difficulty breathing    Severe headache; face, neck, or ear pain    Severe, constant pain in the lower right side of your belly (abdominal)    Continued vomiting (can t keep liquids down)    Frequent diarrhea (more than 5 times a day); blood (red or black color) or mucus in diarrhea    Feeling weak, dizzy, or like you are going to faint    Extreme thirst    Fever of 100.4 F (38 C) or higher, or as directed by your healthcare provider  Call 911  Get emergency medical care if any of the following occur:    Convulsion    Feeling weak,  "dizzy, or like you are going to faint    Chest pain, shortness of breath, wheezing, or difficulty breathing    [id number] [employer]. [address] [place] [address], [place] [address]. All rights reserved. This information is not intended as a substitute for professional medical care. Always follow your healthcare professional's instructions.                Follow-ups after your visit        Follow-up notes from your care team     See patient instructions section of the AVS Return if symptoms worsen or fail to improve, for Follow up with your primary care provider.      Who to contact     If you have questions or need follow up information about today's clinic visit or your schedule please contact [facility] directly at [phone].  Normal or non-critical lab and imaging results will be communicated to you by SmartRecruitershart, letter or phone within 4 business days after the clinic has received the results. If you do not hear from us within 7 days, please contact the clinic through SmartRecruitershart or phone. If you have a critical or abnormal lab result, we will notify you by phone as soon as possible.  Submit refill requests through REMOTV or call your pharmacy and they will forward the refill request to us. Please allow 3 business days for your refill to be completed.          Additional Information About Your Visit        SmartRecruitershart Information     REMOTV lets you send messages to your doctor, view your test results, renew your prescriptions, schedule appointments and more. To sign up, go to [email or web address] . Click on \"Log in\" on the left side of the screen, which will take you to the Welcome page. Then click on \"Sign up Now\" on the right side of the page.     You will be asked to enter the access code listed below, as well as some personal information. Please follow the directions to create your username and password.     Your access code is: [email or web address]  Expires: 8/8/2017  2:45 PM     Your " access code will  in 90 days. If you need help or a new code, please call your [facility] clinic or [phone].        Care EveryWhere ID     This is your Care EveryWhere ID. This could be used by other organizations to access your [facility] medical records  [id number]        Your Vitals Were     Pulse Temperature Respirations Pulse Oximetry BMI (Body Mass Index)       69 98  F (36.7  C) (Tympanic) 20 96% 27.77 kg/m2        Blood Pressure from Last 3 Encounters:   17 128/72   05/10/17 136/75   17 138/82    Weight from Last 3 Encounters:   17 184 lb (83.5 kg)   05/10/17 183 lb (83 kg)   17 188 lb (85.3 kg)                 Today's Medication Changes          These changes are accurate as of: 17  1:49 PM.  If you have any questions, ask your nurse or doctor.               Start taking these medicines.        Dose/Directions    benzonatate 200 MG capsule   Commonly known as:  TESSALON   Used for:  Cough   Started by:  Isabella Bagley APRN CNP        Dose:  200 mg   Take 1 capsule (200 mg) by mouth 3 times daily as needed for cough   Quantity:  30 capsule   Refills:  0            Where to get your medicines      These medications were sent to [facility] [address] [address]     Phone:  [phone]     benzonatate 200 MG capsule                Primary Care Provider Office Phone # Fax #    Terence Thornton [phone]       [facility] [address] [facility] [address]        Thank you!     Thank you for choosing [facility]  for your care. Our goal is always to provide you with excellent care. Hearing back from our patients is one way we can continue to improve our services. Please take a few minutes to complete the written survey that you may receive in the mail after your visit with us. Thank you!             Your Updated Medication List - Protect  others around you: Learn how to safely use, store and throw away your medicines at www.disposemymeds.org.          This list is accurate as of: 6/5/17  1:49 PM.  Always use your most recent med list.                   Brand Name Dispense Instructions for use    BENADRYL 25 MG tablet   Generic drug:  diphenhydrAMINE     56    1 TABLET EVERY 4 TO 6 HOURS AS NEEDED       benzonatate 200 MG capsule    TESSALON    30 capsule    Take 1 capsule (200 mg) by mouth 3 times daily as needed for cough       CIALIS 10 MG tablet   Generic drug:  tadalafil      Take by mouth daily as needed for erectile dysfunction       diflorasone 0.05 % ointment    PSORCON    30 g    Apply to Leg eczema once daily as needed.       fluticasone 50 MCG/ACT spray    FLONASE    1 Bottle    Spray 1-2 sprays into both nostrils daily       hydrocortisone 2.5 % cream    ANUSOL-HC    30 g    Place rectally 2 times daily       IBUPROFEN PO      Take 400 mg by mouth every 6 hours as needed for moderate pain       MICONAZOLE      applying to toes PRN       simvastatin 20 MG tablet    ZOCOR    90 tablet    Take 1 tablet (20 mg) by mouth At Bedtime       TYLENOL PO      Take 500 mg by mouth

## 2017-06-05 NOTE — PROGRESS NOTES
SUBJECTIVE:                                                    Karlos Wood is a 71 year old male who presents to clinic today for the following health issues:      Cough       Duration: 2 weeks     Description (location/character/radiation): cough     Intensity:  moderate    Accompanying signs and symptoms: wake up during the night coughing and cant stop, nasal passages are congested, during the day he gets better but it gets worse at night, nasal drip down throat     History (similar episodes/previous evaluation): None    Precipitating or alleviating factors: None    Therapies tried and outcome: OTC medications, benedryl            Problem list and histories reviewed & adjusted, as indicated.  Additional history: as documented    Patient Active Problem List   Diagnosis     Lipoma of skin and subcutaneous tissue     HYPERLIPIDEMIA LDL GOAL <130     Pain in shoulder     Varicose veins     Trochanteric bursitis     Advanced directives, counseling/discussion     Health Care Home     Bunion     Colon polyps     Past Surgical History:   Procedure Laterality Date     COLONOSCOPY  5/9/2007     FLEXIBLE SIGMOIDOSCOPY  3/27/2001    Flexible Sigmoidoscopy to 55 cm.     OSTEOTOMY FOOT  3/27/2014    Procedure: OSTEOTOMY FOOT;  Left Foot 1st Metatarsal Corrective Osteotomy, 3rd Toe Correction;  Surgeon: Delroy Teixeira DPM;  Location: [facility] OR     SURGICAL HISTORY OF -   12/12/2002    Arthroscopic subacromial decompression     SURGICAL HISTORY OF -   1982    Right Ankle Repair for bone chips     SURGICAL HISTORY OF -   3/2009    lipoma on back       Social History   Substance Use Topics     Smoking status: Former Smoker     Smokeless tobacco: Never Used     Alcohol use Yes      Comment: 1-2 roberto every other week     Family History   Problem Relation Age of Onset     CANCER Mother      lung     CANCER Father      lung     Breast Cancer Sister      Asthma No family hx of      C.A.D. No family hx of      DIABETES No  family hx of      Hypertension No family hx of      CEREBROVASCULAR DISEASE No family hx of      Cancer - colorectal No family hx of      Prostate Cancer No family hx of          Current Outpatient Prescriptions   Medication Sig Dispense Refill     benzonatate (TESSALON) 200 MG capsule Take 1 capsule (200 mg) by mouth 3 times daily as needed for cough 30 capsule 0     simvastatin (ZOCOR) 20 MG tablet Take 1 tablet (20 mg) by mouth At Bedtime 90 tablet 3     fluticasone (FLONASE) 50 MCG/ACT spray Spray 1-2 sprays into both nostrils daily 1 Bottle 0     tadalafil (CIALIS) 10 MG tablet Take by mouth daily as needed for erectile dysfunction       hydrocortisone (ANUSOL-HC) 2.5 % cream Place rectally 2 times daily 30 g 0     Acetaminophen (TYLENOL PO) Take 500 mg by mouth       IBUPROFEN PO Take 400 mg by mouth every 6 hours as needed for moderate pain       diflorasone (PSORCON) 0.05 % ointment Apply to Leg eczema once daily as needed. 30 g 3     BENADRYL 25 MG OR TABS 1 TABLET EVERY 4 TO 6 HOURS AS NEEDED 56 0     MICONAZOLE applying to toes PRN       Allergies   Allergen Reactions     Cephalosporins      Dicloxacillin      Garamycin [Aminoglycosides]      6/95 PCN-Hosp overnight     Penicillin [Esters]      Zithromax [Azithromycin]      Itchy      Labs reviewed in EPIC    Reviewed and updated as needed this visit by clinical staff  Tobacco  Allergies  Meds  Problems  Med Hx  Surg Hx  Fam Hx  Soc Hx        Reviewed and updated as needed this visit by Provider  Allergies  Meds  Problems         ROS:  Constitutional, HEENT, cardiovascular, pulmonary, GI, , musculoskeletal, neuro, skin, endocrine and psych systems are negative, except as otherwise noted.    OBJECTIVE:                                                    /72  Pulse 69  Temp 98  F (36.7  C) (Tympanic)  Resp 20  Wt 184 lb (83.5 kg)  SpO2 96%  BMI 27.77 kg/m2  Body mass index is 27.77 kg/(m^2).  GENERAL: healthy, alert and no distress,  nontoxic in appearance  EYES: Eyes grossly normal to inspection, conjunctivae and sclerae normal  HENT: ear canals and TM's normal, nose and mouth without ulcers or lesions  NECK: no adenopathy, supple with full ROM  RESP: lungs clear to auscultation - no rales, rhonchi or wheezes  CV: regular rate and rhythm, normal S1 S2, no S3 or S4, no murmur, click or rub, no peripheral edema and peripheral pulses strong  ABDOMEN: soft, nontender, no hepatosplenomegaly, no masses   MS: no gross musculoskeletal defects noted, no edema  No rash  XR CHEST 2 VW 6/5/2017 1:15 PM     HISTORY: Cough.     COMPARISON: 3/7/2015.         IMPRESSION: 2 views of the chest show no acute cardiopulmonary disease  and no significant change.      JOSEFINA SHEPPARD MD  Diagnostic Test Results:  No results found for this or any previous visit (from the past 24 hour(s)).     ASSESSMENT/PLAN:                                                      Problem List Items Addressed This Visit     None      Visit Diagnoses     Viral syndrome    -  Primary    Cough        Relevant Medications    benzonatate (TESSALON) 200 MG capsule    Other Relevant Orders    XR Chest 2 Views (Completed)               Patient Instructions   Increase rest and fluids. Tylenol and/or Ibuprofen for comfort. Cool mist vaporizer. If your symptoms worsen or do not resolve follow up with your primary care provider in 1 week and sooner if needed.     Mucinex 600 mg 12 hr formula can also help with head and chest congestion and post nasal drip.     Indications for emergent return to emergency department discussed with patient, who verbalized good understanding and agreement.  Patient understands the limitations of today's evaluation.           Viral Syndrome (Adult)  A viral illness may cause a number of symptoms. The symptoms depend on the part of the body that the virus affects. If it settles in the nose, throat, and lungs, it may cause cough, sore throat, congestion, and sometimes  "headache. If it settles in the stomach and intestinal tract, it may cause vomiting and diarrhea. Sometimes it causes vague symptoms like \"aching all over,\" feeling tired, loss of appetite, or fever.  A viral illness usually lasts 1 to 2 weeks, but sometimes it lasts longer. In some cases, a more serious infection can look like a viral syndrome in the first few days of the illness. You may need another exam and additional tests to know the difference. Watch for the warning signs listed below.  Home care  Follow these guidelines for taking care of yourself at home:    If symptoms are severe, rest at home for the first 2 to 3 days.    Stay away from cigarette smoke - both your smoke and the smoke from others.    You may use over-the-counter medication acetaminophen or ibuprofen for fever, muscle aching, and headache, unless another medicine was prescribed for this. If you have chronic liver or kidney disease or ever had a stomach ulcer or GI bleeding, talk with your doctor before using these medicines . No one who is younger than 18 and ill with a fever should take aspirin. It may cause severe disease or death liver damage .    Your appetite may be poor, so a light diet is fine. Avoid dehydration by drinking 8 to 12 8-ounce glasses of fluids each day. This may include water; orange juice; lemonade; apple, grape, and cranberry juice; clear fruit drinks; electrolyte replacement and sports drinks; and decaffeinated teas and coffee. If you have been diagnosed with a kidney disease, ask your doctor how much and what types of fluids you should drink to prevent dehydration. If you have kidney disease, drinking too much fluid can cause it build up in the your body and be dangerous to your health.    Over-the-counter remedies won't shorten the length of the illness but may be helpful for cough, sore throat; and nasal and sinus congestion. Don't use decongestants if you have high blood pressure.  Follow-up care  Follow up with " your health care provider if you do not improve over the next week.  When to seek medical advice  Call your healthcare provider right away if any of these occur:    Cough with lots of colored sputum (mucus) or blood in your sputum    Chest pain, shortness of breath, wheezing, or difficulty breathing    Severe headache; face, neck, or ear pain    Severe, constant pain in the lower right side of your belly (abdominal)    Continued vomiting (can t keep liquids down)    Frequent diarrhea (more than 5 times a day); blood (red or black color) or mucus in diarrhea    Feeling weak, dizzy, or like you are going to faint    Extreme thirst    Fever of 100.4 F (38 C) or higher, or as directed by your healthcare provider  Call 911  Get emergency medical care if any of the following occur:    Convulsion    Feeling weak, dizzy, or like you are going to faint    Chest pain, shortness of breath, wheezing, or difficulty breathing    [id number] [employer]. [address] [place] [address]. All rights reserved. This information is not intended as a substitute for professional medical care. Always follow your healthcare professional's instructions.            [facility]

## 2017-10-04 ENCOUNTER — ALLIED HEALTH/NURSE VISIT (OUTPATIENT)
Dept: FAMILY MEDICINE | Facility: CLINIC | Age: 72
End: 2017-10-04
Payer: COMMERCIAL

## 2017-10-04 DIAGNOSIS — Z23 NEED FOR PROPHYLACTIC VACCINATION AND INOCULATION AGAINST INFLUENZA: Primary | ICD-10-CM

## 2017-10-04 PROCEDURE — 90662 IIV NO PRSV INCREASED AG IM: CPT

## 2017-10-04 PROCEDURE — 99207 ZZC NO CHARGE NURSE ONLY: CPT

## 2017-10-04 PROCEDURE — G0008 ADMIN INFLUENZA VIRUS VAC: HCPCS

## 2017-10-04 NOTE — MR AVS SNAPSHOT
"              After Visit Summary   10/4/2017    Karlos Wood    MRN: [id number]           Patient Information     Date Of Birth          1945        Visit Information        Provider Department      10/4/2017 10:35 AM [facility]        Today's Diagnoses     Need for prophylactic vaccination and inoculation against influenza    -  1       Follow-ups after your visit        Who to contact     If you have questions or need follow up information about today's clinic visit or your schedule please contact [facility] directly at [phone].  Normal or non-critical lab and imaging results will be communicated to you by Harry [employer] Davidhart, letter or phone within 4 business days after the clinic has received the results. If you do not hear from us within 7 days, please contact the clinic through InboxFevert or phone. If you have a critical or abnormal lab result, we will notify you by phone as soon as possible.  Submit refill requests through Informative or call your pharmacy and they will forward the refill request to us. Please allow 3 business days for your refill to be completed.          Additional Information About Your Visit        MyChart Information     Informative lets you send messages to your doctor, view your test results, renew your prescriptions, schedule appointments and more. To sign up, go to [email or web address] . Click on \"Log in\" on the left side of the screen, which will take you to the Welcome page. Then click on \"Sign up Now\" on the right side of the page.     You will be asked to enter the access code listed below, as well as some personal information. Please follow the directions to create your username and password.     Your access code is: [email or web address]  Expires: 2018 11:02 AM     Your access code will  in 90 days. If you need help or a new code, please call your [facility] or [phone].        Care EveryWhere ID     This is your Care " EveryWhere ID. This could be used by other organizations to access your [facility] medical records  [id number]         Blood Pressure from Last 3 Encounters:   06/05/17 128/72   05/10/17 136/75   02/16/17 138/82    Weight from Last 3 Encounters:   06/05/17 184 lb (83.5 kg)   05/10/17 183 lb (83 kg)   02/16/17 188 lb (85.3 kg)              We Performed the Following     ADMIN INFLUENZA (For MEDICARE Patients ONLY) []     FLU VACCINE, INCREASED ANTIGEN, PRESV FREE, AGE 65+ [23527]        Primary Care Provider Office Phone # Fax #    Terence Thornton [phone] [address] [facility] [address]        Equal Access to Services     DIANA MENESES : Hadii lizbeth christie hadasho Soomaali, waaxda luqadaha, qaybta kaalmada adeegyada, naomi solano . So [facility] [phone].    ATENCIÓN: Si habla español, tiene a arora disposición servicios gratuitos de asistencia lingüística. Llame al [phone].    We comply with applicable federal civil rights laws and [place] laws. We do not discriminate on the basis of race, color, national origin, age, disability, sex, sexual orientation, or gender identity.            Thank you!     Thank you for choosing [facility]  for your care. Our goal is always to provide you with excellent care. Hearing back from our patients is one way we can continue to improve our services. Please take a few minutes to complete the written survey that you may receive in the mail after your visit with us. Thank you!             Your Updated Medication List - Protect others around you: Learn how to safely use, store and throw away your medicines at www.disposemymeds.org.          This list is accurate as of: 10/4/17 11:02 AM.  Always use your most recent med list.                   Brand Name Dispense Instructions for use Diagnosis    BENADRYL 25 MG tablet   Generic drug:  diphenhydrAMINE     56    1 TABLET EVERY 4 TO 6 HOURS AS NEEDED         benzonatate 200 MG capsule    TESSALON    30 capsule    Take 1 capsule (200 mg) by mouth 3 times daily as needed for cough    Cough       CIALIS 10 MG tablet   Generic drug:  tadalafil      Take by mouth daily as needed for erectile dysfunction        diflorasone 0.05 % ointment    PSORCON    30 g    Apply to Leg eczema once daily as needed.    Eczema       fluticasone 50 MCG/ACT spray    FLONASE    1 Bottle    Spray 1-2 sprays into both nostrils daily    Sinus drainage       hydrocortisone 2.5 % cream    ANUSOL-HC    30 g    Place rectally 2 times daily    External hemorrhoids       IBUPROFEN PO      Take 400 mg by mouth every 6 hours as needed for moderate pain        MICONAZOLE      applying to toes PRN        simvastatin 20 MG tablet    ZOCOR    90 tablet    Take 1 tablet (20 mg) by mouth At Bedtime    Hyperlipidemia LDL goal <130       TYLENOL PO      Take 500 mg by mouth

## 2017-10-04 NOTE — PROGRESS NOTES
Injectable Influenza Immunization Documentation    1.  Is the person to be vaccinated sick today?   No    2. Does the person to be vaccinated have an allergy to a component   of the vaccine?   No    3. Has the person to be vaccinated ever had a serious reaction   to influenza vaccine in the past?   No    4. Has the person to be vaccinated ever had Guillain-Barré syndrome?   No    Form completed by Melony Mercer, [facility]

## 2017-10-05 ENCOUNTER — TELEPHONE (OUTPATIENT)
Dept: FAMILY MEDICINE | Facility: CLINIC | Age: 72
End: 2017-10-05

## 2017-10-05 DIAGNOSIS — K64.4 EXTERNAL HEMORRHOIDS: Primary | ICD-10-CM

## 2017-10-05 NOTE — TELEPHONE ENCOUNTER
Reason for call:  Patient reporting a symptom    Symptom or request: Pt's Hemorrhoids have been bad again lately, so he'd like a refill on the hemorrhoid cream he's had in the past - [facility]    Duration (how long have symptoms been present): 2 weeks    Have you been treated for this before? Yes    Additional comments:     Phone Number patient can be reached at:  Cell number on file:    Telephone Information:   Mobile [phone]       Best Time:  any    Can we leave a detailed message on this number:  YES    Call taken on 10/5/2017 at 9:24 AM by Shirlene Mayes

## 2018-02-08 ENCOUNTER — OFFICE VISIT (OUTPATIENT)
Dept: FAMILY MEDICINE | Facility: CLINIC | Age: 73
End: 2018-02-08
Payer: COMMERCIAL

## 2018-02-08 VITALS
HEIGHT: 68 IN | SYSTOLIC BLOOD PRESSURE: 126 MMHG | HEART RATE: 72 BPM | TEMPERATURE: 96.7 F | DIASTOLIC BLOOD PRESSURE: 68 MMHG | BODY MASS INDEX: 29.07 KG/M2 | WEIGHT: 191.8 LBS

## 2018-02-08 DIAGNOSIS — L23.7 CONTACT DERMATITIS DUE TO POISON IVY: ICD-10-CM

## 2018-02-08 DIAGNOSIS — K64.4 EXTERNAL HEMORRHOIDS: ICD-10-CM

## 2018-02-08 DIAGNOSIS — R21 RASH: Primary | ICD-10-CM

## 2018-02-08 PROCEDURE — 99213 OFFICE O/P EST LOW 20 MIN: CPT | Performed by: PHYSICIAN ASSISTANT

## 2018-02-08 RX ORDER — FLUOCINONIDE 0.5 MG/G
CREAM TOPICAL
Qty: 30 G | Refills: 0 | Status: SHIPPED | OUTPATIENT
Start: 2018-02-08 | End: 2018-03-02

## 2018-02-08 RX ORDER — HYDROXYZINE HYDROCHLORIDE 25 MG/1
25-50 TABLET, FILM COATED ORAL EVERY 6 HOURS PRN
Qty: 60 TABLET | Refills: 1 | Status: SHIPPED | OUTPATIENT
Start: 2018-02-08

## 2018-02-08 ASSESSMENT — ENCOUNTER SYMPTOMS
DOUBLE VISION: 0
DIARRHEA: 0
VOMITING: 0
LOSS OF CONSCIOUSNESS: 0
WEIGHT LOSS: 0
TINGLING: 0
BACK PAIN: 0
DIAPHORESIS: 0
EYE DISCHARGE: 0
WHEEZING: 0
EYE REDNESS: 0
FEVER: 0
BLURRED VISION: 0
ABDOMINAL PAIN: 0
NECK PAIN: 0
PHOTOPHOBIA: 0
PALPITATIONS: 0
NEUROLOGICAL NEGATIVE: 1
ORTHOPNEA: 0
DYSURIA: 0
SENSORY CHANGE: 0
DEPRESSION: 0
FOCAL WEAKNESS: 0
COUGH: 0
HEADACHES: 0
MYALGIAS: 0
HEARTBURN: 0
SHORTNESS OF BREATH: 0
HEMOPTYSIS: 0
DIZZINESS: 0
NERVOUS/ANXIOUS: 0
NAUSEA: 0
INSOMNIA: 0
SORE THROAT: 0
SPUTUM PRODUCTION: 0
FREQUENCY: 0
BLOOD IN STOOL: 0
SEIZURES: 0
WEAKNESS: 0
HALLUCINATIONS: 0
CONSTIPATION: 0
EYE PAIN: 0

## 2018-02-08 ASSESSMENT — LIFESTYLE VARIABLES: SUBSTANCE_ABUSE: 0

## 2018-02-08 NOTE — NURSING NOTE
"Chief Complaint   Patient presents with     Derm Problem       Initial /68 (BP Location: Right arm, Patient Position: Chair, Cuff Size: Adult Large)  Pulse 72  Temp 96.7  F (35.9  C) (Tympanic)  Ht 5' 8.25\" (1.734 m)  Wt 191 lb 12.8 oz (87 kg)  BMI 28.95 kg/m2 Estimated body mass index is 28.95 kg/(m^2) as calculated from the following:    Height as of this encounter: 5' 8.25\" (1.734 m).    Weight as of this encounter: 191 lb 12.8 oz (87 kg).      Health Maintenance that is potentially due pending provider review:  NONE    n/a    Is there anyone who you would like to be able to receive your results? No  If yes have patient fill out [facility]    "

## 2018-02-08 NOTE — PROGRESS NOTES
HPI  SUBJECTIVE:   Karlos Wood is a 72 year old male who presents to clinic today for itching and subsequent rash that has been over the abdomen and back for the past month.  He has not been using any moisturizers or creams.      Itching      Duration: over a month    Description (location/character/radiation): chest, abodmen and back    Intensity:  severe    Accompanying signs and symptoms: none    History (similar episodes/previous evaluation): None    Precipitating or alleviating factors: None    Therapies tried and outcome: Benadryl helps           Problem list and histories reviewed & adjusted, as indicated.  Additional history: as documented    Patient Active Problem List   Diagnosis     Lipoma of skin and subcutaneous tissue     HYPERLIPIDEMIA LDL GOAL <130     Pain in shoulder     Varicose veins     Trochanteric bursitis     Advanced directives, counseling/discussion     Health Care Home     Bunion     Colon polyps     Past Surgical History:   Procedure Laterality Date     COLONOSCOPY  5/9/2007     FLEXIBLE SIGMOIDOSCOPY  3/27/2001    Flexible Sigmoidoscopy to 55 cm.     OSTEOTOMY FOOT  3/27/2014    Procedure: OSTEOTOMY FOOT;  Left Foot 1st Metatarsal Corrective Osteotomy, 3rd Toe Correction;  Surgeon: Delroy Teixeira DPM;  Location: [facility]     SURGICAL HISTORY OF -   12/12/2002    Arthroscopic subacromial decompression     SURGICAL HISTORY OF -   1982    Right Ankle Repair for bone chips     SURGICAL HISTORY OF -   3/2009    lipoma on back       Social History   Substance Use Topics     Smoking status: Former Smoker     Smokeless tobacco: Never Used     Alcohol use Yes      Comment: 1-2 roberto every other week     Family History   Problem Relation Age of Onset     CANCER Mother      lung     CANCER Father      lung     Breast Cancer Sister      Asthma No family hx of      C.A.D. No family hx of      DIABETES No family hx of      Hypertension No family hx of      CEREBROVASCULAR DISEASE No family hx  of      Cancer - colorectal No family hx of      Prostate Cancer No family hx of          Current Outpatient Prescriptions   Medication Sig Dispense Refill     hydrocortisone (ANUSOL-HC) 2.5 % cream Place rectally 2 times daily 30 g 0     fluocinonide (LIDEX) 0.05 % cream Apply sparingly to affected area twice daily for 14 days.  Do not apply to face. 30 g 0     hydrOXYzine (ATARAX) 25 MG tablet Take 1-2 tablets (25-50 mg) by mouth every 6 hours as needed for itching 60 tablet 1     simvastatin (ZOCOR) 20 MG tablet Take 1 tablet (20 mg) by mouth At Bedtime 90 tablet 3     tadalafil (CIALIS) 10 MG tablet Take by mouth daily as needed for erectile dysfunction       Acetaminophen (TYLENOL PO) Take 500 mg by mouth       IBUPROFEN PO Take 400 mg by mouth every 6 hours as needed for moderate pain       diflorasone (PSORCON) 0.05 % ointment Apply to Leg eczema once daily as needed. 30 g 3     BENADRYL 25 MG OR TABS 1 TABLET EVERY 4 TO 6 HOURS AS NEEDED 56 0     MICONAZOLE applying to toes PRN       Allergies   Allergen Reactions     Cephalosporins      Dicloxacillin      Garamycin [Aminoglycosides]      6/95 PCN-Hosp overnight     Penicillin [Esters]      Zithromax [Azithromycin]      Itchy      Labs reviewed in EPIC    Reviewed and updated as needed this visit by clinical staff       Reviewed and updated as needed this visit by Provider             Review of Systems   Constitutional: Negative for diaphoresis, fever, malaise/fatigue and weight loss.   HENT: Negative for congestion, ear discharge, ear pain, hearing loss, nosebleeds and sore throat.    Eyes: Negative for blurred vision, double vision, photophobia, pain, discharge and redness.   Respiratory: Negative for cough, hemoptysis, sputum production, shortness of breath and wheezing.    Cardiovascular: Negative for chest pain, palpitations, orthopnea and leg swelling.   Gastrointestinal: Negative for abdominal pain, blood in stool, constipation, diarrhea, heartburn,  melena, nausea and vomiting.   Genitourinary: Negative.  Negative for dysuria, frequency and urgency.   Musculoskeletal: Negative for back pain, joint pain, myalgias and neck pain.   Skin: Positive for itching and rash.   Neurological: Negative.  Negative for dizziness, tingling, sensory change, focal weakness, seizures, loss of consciousness, weakness and headaches.   Endo/Heme/Allergies: Negative.    Psychiatric/Behavioral: Negative for depression, hallucinations, substance abuse and suicidal ideas. The patient is not nervous/anxious and does not have insomnia.          Physical Exam   Constitutional: He is oriented to person, place, and time and well-developed, well-nourished, and in no distress.   HENT:   Head: Normocephalic and atraumatic.   Right Ear: External ear normal.   Left Ear: External ear normal.   Nose: Nose normal.   Mouth/Throat: Oropharynx is clear and moist.   Eyes: Conjunctivae and EOM are normal. Pupils are equal, round, and reactive to light. Right eye exhibits no discharge. Left eye exhibits no discharge. No scleral icterus.   Neck: Normal range of motion. Neck supple. No thyromegaly present.   Cardiovascular: Normal rate, regular rhythm, normal heart sounds and intact distal pulses.  Exam reveals no gallop and no friction rub.    No murmur heard.  Pulmonary/Chest: Effort normal and breath sounds normal. No respiratory distress. He has no wheezes. He has no rales. He exhibits no tenderness.   Abdominal: Soft. Bowel sounds are normal. He exhibits no distension and no mass. There is no tenderness. There is no rebound and no guarding.   Musculoskeletal: Normal range of motion. He exhibits no edema or tenderness.   Lymphadenopathy:     He has no cervical adenopathy.   Neurological: He is alert and oriented to person, place, and time. He has normal reflexes. No cranial nerve deficit. He exhibits normal muscle tone. Gait normal. Coordination normal.   Skin: Skin is warm and dry. Rash noted. Rash is  macular. No erythema.   Dry skin is present   Psychiatric: Mood, memory, affect and judgment normal.       (R21) Rash  (primary encounter diagnosis)  Comment:   Plan: hydrOXYzine (ATARAX) 25 MG tablet, DERMATOLOGY         REFERRAL            (K64.4) External hemorrhoids  Comment:   Plan: hydrocortisone (ANUSOL-HC) 2.5 % cream            Follow-up if not improving.  He will use a skin moisturizer over-the-counter as well.  Atarax for the itching

## 2018-02-08 NOTE — MR AVS SNAPSHOT
After Visit Summary   2/8/2018    Karlos Wood    MRN: [id number]           Patient Information     Date Of Birth          1945        Visit Information        Provider Department      2/8/2018 8:40 AM Puneet Barrientos PA-C [facility]        Today's Diagnoses     Rash    -  1    External hemorrhoids        Contact dermatitis due to poison ivy           Follow-ups after your visit        Additional Services     DERMATOLOGY REFERRAL       Your provider has referred you to: [facility]: [facility] [phone]   [email or web address]    Please be aware that coverage of these services is subject to the terms and limitations of your health insurance plan.  Call member services at your health plan with any benefit or coverage questions.      Please bring the following with you to your appointment:    (1) Any X-Rays, CTs or MRIs which have been performed.  Contact the facility where they were done to arrange for  prior to your scheduled appointment.    (2) List of current medications  (3) This referral request   (4) Any documents/labs given to you for this referral                  Who to contact     If you have questions or need follow up information about today's clinic visit or your schedule please contact [facility] directly at [phone].  Normal or non-critical lab and imaging results will be communicated to you by MyChart, letter or phone within 4 business days after the clinic has received the results. If you do not hear from us within 7 days, please contact the clinic through MyChart or phone. If you have a critical or abnormal lab result, we will notify you by phone as soon as possible.  Submit refill requests through [employer] or call your pharmacy and they will forward the refill request to us. Please allow 3 business days for your refill to be completed.          Additional Information About Your  "Visit        Tomorrowish Information     [employer] lets you send messages to your doctor, view your test results, renew your prescriptions, schedule appointments and more. To sign up, go to [email or web address] . Click on \"Log in\" on the left side of the screen, which will take you to the Welcome page. Then click on \"Sign up Now\" on the right side of the page.     You will be asked to enter the access code listed below, as well as some personal information. Please follow the directions to create your username and password.     Your access code is: [email or web address]  Expires: 2018  9:12 AM     Your access code will  in 90 days. If you need help or a new code, please call your [place] clinic or [phone].        Care EveryWhere ID     This is your Care EveryWhere ID. This could be used by other organizations to access your [place] medical records  [id number]        Your Vitals Were     Pulse Temperature Height BMI (Body Mass Index)          72 96.7  F (35.9  C) (Tympanic) 5' 8.25\" (1.734 m) 28.95 kg/m2         Blood Pressure from Last 3 Encounters:   18 126/68   17 128/72   05/10/17 136/75    Weight from Last 3 Encounters:   18 191 lb 12.8 oz (87 kg)   17 184 lb (83.5 kg)   05/10/17 183 lb (83 kg)              We Performed the Following     DERMATOLOGY REFERRAL          Today's Medication Changes          These changes are accurate as of 18  9:12 AM.  If you have any questions, ask your nurse or doctor.               Start taking these medicines.        Dose/Directions    fluocinonide 0.05 % cream   Commonly known as:  LIDEX   Used for:  Contact dermatitis due to poison ivy   Started by:  Puneet Barrientos PA-C        Apply sparingly to affected area twice daily for 14 days.  Do not apply to face.   Quantity:  30 g   Refills:  0       hydrOXYzine 25 MG tablet   Commonly known as:  ATARAX   Used for:  Rash   Started by:  Puneet Barrientos PA-C        Dose:  25-50 mg   Take 1-2 tablets " (25-50 mg) by mouth every 6 hours as needed for itching   Quantity:  60 tablet   Refills:  1            Where to get your medicines      These medications were sent to [id number] - [place], [place] - [address]  [address], [place] [address]     Phone:  [phone]     fluocinonide 0.05 % cream    hydrocortisone 2.5 % cream    hydrOXYzine 25 MG tablet                Primary Care Provider Office Phone # Fax #    Terence Thornton [phone] [address]  [facility] [address]        Equal Access to Services     [facility]: Hadii aad ku hadasho Soomaali, waaxda luqadaha, qaybta kaalmada adeegyada, naomi solano . So [facility] [phone].    ATENCIÓN: Si habla español, tiene a arora disposición servicios gratuitos de asistencia lingüística. [facility] [phone].    We comply with applicable federal civil rights laws and [place] laws. We do not discriminate on the basis of race, color, national origin, age, disability, sex, sexual orientation, or gender identity.            Thank you!     Thank you for choosing [facility]  for your care. Our goal is always to provide you with excellent care. Hearing back from our patients is one way we can continue to improve our services. Please take a few minutes to complete the written survey that you may receive in the mail after your visit with us. Thank you!             Your Updated Medication List - Protect others around you: Learn how to safely use, store and throw away your medicines at www.disposemymeds.org.          This list is accurate as of 2/8/18  9:12 AM.  Always use your most recent med list.                   Brand Name Dispense Instructions for use Diagnosis    BENADRYL 25 MG tablet   Generic drug:  diphenhydrAMINE     56    1 TABLET EVERY 4 TO 6 HOURS AS NEEDED        CIALIS 10 MG tablet   Generic drug:  tadalafil      Take by mouth daily as needed for erectile dysfunction        diflorasone  0.05 % ointment    PSORCON    30 g    Apply to Leg eczema once daily as needed.    Eczema       fluocinonide 0.05 % cream    LIDEX    30 g    Apply sparingly to affected area twice daily for 14 days.  Do not apply to face.    Contact dermatitis due to poison ivy       hydrocortisone 2.5 % cream    ANUSOL-HC    30 g    Place rectally 2 times daily    External hemorrhoids       hydrOXYzine 25 MG tablet    ATARAX    60 tablet    Take 1-2 tablets (25-50 mg) by mouth every 6 hours as needed for itching    Rash       IBUPROFEN PO      Take 400 mg by mouth every 6 hours as needed for moderate pain        MICONAZOLE      applying to toes PRN        simvastatin 20 MG tablet    ZOCOR    90 tablet    Take 1 tablet (20 mg) by mouth At Bedtime    Hyperlipidemia LDL goal <130       TYLENOL PO      Take 500 mg by mouth

## 2018-02-15 ENCOUNTER — TRANSFERRED RECORDS (OUTPATIENT)
Dept: HEALTH INFORMATION MANAGEMENT | Facility: CLINIC | Age: 73
End: 2018-02-15

## 2018-02-22 ENCOUNTER — TELEPHONE (OUTPATIENT)
Dept: FAMILY MEDICINE | Facility: CLINIC | Age: 73
End: 2018-02-22

## 2018-02-22 NOTE — TELEPHONE ENCOUNTER
Reason for Call:  Other Bloody Stools    Detailed comments: He has 3 bloody stools.  He said that his hemidroids are acting up and not healing themselves.  He has an appt set up with Dr. Thornton tomorrow but he is wondering if he should see a specialists?  Please advise.    Phone Number Patient can be reached at: Home number on file [phone] (home)    Best Time: any    Can we leave a detailed message on this number? YES    Call taken on 2/22/2018 at 10:05 AM by Barbie Watters

## 2018-02-22 NOTE — TELEPHONE ENCOUNTER
Pt is having 4-5 loose stools/day, not constipated.  Will keep  appt tomorrow to discuss options.  [email or web address]

## 2018-02-23 ENCOUNTER — OFFICE VISIT (OUTPATIENT)
Dept: FAMILY MEDICINE | Facility: CLINIC | Age: 73
End: 2018-02-23
Payer: COMMERCIAL

## 2018-02-23 VITALS
BODY MASS INDEX: 28.95 KG/M2 | WEIGHT: 191 LBS | RESPIRATION RATE: 18 BRPM | TEMPERATURE: 98.6 F | DIASTOLIC BLOOD PRESSURE: 77 MMHG | SYSTOLIC BLOOD PRESSURE: 137 MMHG | HEIGHT: 68 IN

## 2018-02-23 DIAGNOSIS — M70.61 TROCHANTERIC BURSITIS OF RIGHT HIP: ICD-10-CM

## 2018-02-23 DIAGNOSIS — K64.9 HEMORRHOIDS, UNSPECIFIED HEMORRHOID TYPE: Primary | ICD-10-CM

## 2018-02-23 PROCEDURE — 99213 OFFICE O/P EST LOW 20 MIN: CPT | Performed by: FAMILY MEDICINE

## 2018-02-23 NOTE — PROGRESS NOTES
"  SUBJECTIVE:   Karlos Wood is a 72 year old male who presents to clinic today for the following health issues:      Hemorrhoids  Onset: x 4 years     Description:   Pain: no   Itching: no     Accompanying Signs & Symptoms:  Blood streaked toilet paper: YES  Blood in stool: YES  Changes in stool pattern: YES    History:   Any previous GI studies done:Colonoscopy  Family History of colon cancer: no     Precipitating factors:   None    Alleviating factors:  None    Therapies Tried and outcome: preparation H    Joint Pain    Onset: 5-6 years     Description:   Location: right hip  Character: Stabbing    Intensity: moderate, severe    Progression of Symptoms: worse    Accompanying Signs & Symptoms:  Other symptoms: none    History:   Previous similar pain: YES      Precipitating factors:   Trauma or overuse: no     Alleviating factors:  Improved by:  Cortisone     Therapies Tried and outcome: see above             Problem list and histories reviewed & adjusted, as indicated.  Additional history: as documented        Reviewed and updated as needed this visit by clinical staff  Tobacco  Allergies  Meds  Med Hx  Surg Hx  Fam Hx  Soc Hx      Reviewed and updated as needed this visit by Provider      Further history obtained, clarified or corrected by physician:  He is again had recurrent rectal bleeding from he believes hemorrhoids.  This is a recurring problem despite using steroid cream.  It is happening more frequently and he would like a more definitive treatment.  Also he has trochanteric bursitis on the right for which she has been getting cortisone shots from the orthopedist and he is wanting to consider possible surgery.    OBJECTIVE:  /77  Temp 98.6  F (37  C)  Resp 18  Ht 5' 8.25\" (1.734 m)  Wt 191 lb (86.6 kg)  BMI 28.83 kg/m2  LUNGS: clear to auscultation, normal breath sounds  CV: RRR without murmur  ABD: BS+, soft, nontender, no masses, no hepatosplenomegaly  EXTREMITIES: without joint " tenderness, swelling or erythema.  No muscle tenderness or abnormality.  SKIN: No rashes or abnormalities  NEURO:non focal exam    ASSESSMENT:     Hemorrhoids, unspecified hemorrhoid type  Trochanteric bursitis of right hip    PLAN:  He would like to see the general surgeon about possibility of hemorrhoid procedure  Orthopedic consultation for a second opinion on the possibility of procedure for dealing with the bursitis.    Orders Placed This Encounter     GENERAL SURG ADULT REFERRAL     ORTHO  REFERRAL

## 2018-02-23 NOTE — PATIENT INSTRUCTIONS
Thank you for choosing [facility].  You may be receiving a survey in the mail from [facility] regarding your visit today.  Please take a few minutes to complete and return the survey to let us know how we are doing.      Our Clinic hours are:  Mondays    7:20 am - 7 pm  Tues -  Fri  7:20 am - 5 pm    Clinic Phone: [phone]    The clinic lab opens at 7:30 am Mon - Fri and appointments are required.    [facility]. [phone]  Monday-Thursday 8 am - 7pm  Tues/Wed/Fri 8 am - 5:30 pm

## 2018-02-23 NOTE — MR AVS SNAPSHOT
After Visit Summary   2/23/2018    Karlos Wood    MRN: [id number]           Patient Information     Date Of Birth          1945        Visit Information        Provider Department      2/23/2018 2:20 PM Terence Thornton MD [facility]        Today's Diagnoses     Hemorrhoids, unspecified hemorrhoid type    -  1    Trochanteric bursitis of right hip          Care Instructions          Thank you for choosing [facility] [place].  You may be receiving a survey in the mail from [facility] ([facility] regarding your visit today.  Please take a few minutes to complete and return the survey to let us know how we are doing.      Our Clinic hours are:  Mondays    7:20 am - 7 pm  Tues -  Fri  7:20 am - 5 pm    Clinic Phone: [phone]    The clinic lab opens at 7:30 am Mon - Fri and appointments are required.    [facility]. [phone]  Monday-Thursday 8 am - 7pm  Tues/Wed/Fri 8 am - 5:30 pm                 Follow-ups after your visit        Additional Services     GENERAL SURG ADULT REFERRAL       [place] [facility] [place] [facility] [phone]   [email or web address]    Please be aware that coverage of these services is subject to the terms and limitations of your health insurance plan.  Call member services at your health plan with any benefit or coverage questions.      Please bring the following with you to your appointment:    (1) Any X-Rays, CTs or MRIs which have been performed.  Contact the facility where they were done to arrange for  prior to your scheduled appointment.   (2) List of current medications   (3) This referral request   (4) Any documents/labs given to you for this referral            ORTHO  REFERRAL       [facility] is referring you to the Orthopedic  Services at [facility].       The  Representative will  assist you in the coordination of your Orthopedic and Musculoskeletal Care as prescribed by your physician.    The [facility] Representative will call you within 1 business day to help schedule your appointment, or you may contact the [facility] Representative at:    All areas ~ [phone]     Type of Referral : Surgical / Specialist       Timeframe requested: Routine    Coverage of these services is subject to the terms and limitations of your health insurance plan.  Please call member services at your health plan with any benefit or coverage questions.      If X-rays, CT or MRI's have been performed, please contact the facility where they were done to arrange for , prior to your scheduled appointment.  Please bring this referral request to your appointment and present it to your specialist.                  Your next 10 appointments already scheduled     Mar 06, 2018  9:00 AM CST   New Visit with Sarah Patton PA-C   [facility]    [address] [address] [phone]              Who to contact     If you have questions or need follow up information about today's clinic visit or your schedule please contact [facility] directly at [phone].  Normal or non-critical lab and imaging results will be communicated to you by MyChart, letter or phone within 4 business days after the clinic has received the results. If you do not hear from us within 7 days, please contact the clinic through MyChart or phone. If you have a critical or abnormal lab result, we will notify you by phone as soon as possible.  Submit refill requests through [employer] or call your pharmacy and they will forward the refill request to us. Please allow 3 business days for your refill to be completed.          Additional Information About Your Visit        [facility] [employer] Information     [employer] lets you send messages to your doctor, view your test results,  "renew your prescriptions, schedule appointments and more. To sign up, go to [email or web address] . Click on \"Log in\" on the left side of the screen, which will take you to the Welcome page. Then click on \"Sign up Now\" on the right side of the page.     You will be asked to enter the access code listed below, as well as some personal information. Please follow the directions to create your username and password.     Your access code is: [email or web address]  Expires: 2018  9:12 AM     Your access code will  in 90 days. If you need help or a new code, please call your [place] clinic or [phone].        Care EveryWhere ID     This is your Care EveryWhere ID. This could be used by other organizations to access your [place] medical records  [id number]        Your Vitals Were     Temperature Respirations Height BMI (Body Mass Index)          98.6  F (37  C) 18 5' 8.25\" (1.734 m) 28.83 kg/m2         Blood Pressure from Last 3 Encounters:   18 137/77   18 126/68   17 128/72    Weight from Last 3 Encounters:   18 191 lb (86.6 kg)   18 191 lb 12.8 oz (87 kg)   17 184 lb (83.5 kg)              We Performed the Following     GENERAL SURG ADULT REFERRAL     ORTHO  REFERRAL        Primary Care Provider Office Phone # Fax #    Terence Thornton [phone] [address] [address]        Equal Access to Services     [facility]: Hadii lizbeth christie hadasho Sojay jay, waaxda luqadaha, qaybta kaalmada delores, naomi fernandez. So [facility] [phone].    ATENCIÓN: Si habla español, tiene a arora disposición servicios gratuitos de asistencia lingüística. Llame al [phone].    We comply with applicable federal civil rights laws and [place] laws. We do not discriminate on the basis of race, color, national origin, age, disability, sex, sexual orientation, or gender identity.            Thank you!     Thank you for " choosing [facility]  for your care. Our goal is always to provide you with excellent care. Hearing back from our patients is one way we can continue to improve our services. Please take a few minutes to complete the written survey that you may receive in the mail after your visit with us. Thank you!             Your Updated Medication List - Protect others around you: Learn how to safely use, store and throw away your medicines at www.disposemymeds.org.          This list is accurate as of 2/23/18  3:09 PM.  Always use your most recent med list.                   Brand Name Dispense Instructions for use Diagnosis    BENADRYL 25 MG tablet   Generic drug:  diphenhydrAMINE     56    1 TABLET EVERY 4 TO 6 HOURS AS NEEDED        CIALIS 10 MG tablet   Generic drug:  tadalafil      Take by mouth daily as needed for erectile dysfunction        diflorasone 0.05 % ointment    PSORCON    30 g    Apply to Leg eczema once daily as needed.    Eczema       fluocinonide 0.05 % cream    LIDEX    30 g    Apply sparingly to affected area twice daily for 14 days.  Do not apply to face.    Contact dermatitis due to poison ivy       hydrocortisone 2.5 % cream    ANUSOL-HC    30 g    Place rectally 2 times daily    External hemorrhoids       hydrOXYzine 25 MG tablet    ATARAX    60 tablet    Take 1-2 tablets (25-50 mg) by mouth every 6 hours as needed for itching    Rash       IBUPROFEN PO      Take 400 mg by mouth every 6 hours as needed for moderate pain        MICONAZOLE      applying to toes PRN        simvastatin 20 MG tablet    ZOCOR    90 tablet    Take 1 tablet (20 mg) by mouth At Bedtime    Hyperlipidemia LDL goal <130       TYLENOL PO      Take 500 mg by mouth

## 2018-02-23 NOTE — NURSING NOTE
"Chief Complaint   Patient presents with     Hemorrhoids     Musculoskeletal Problem     right hip pain        Initial /87 (BP Location: Right arm, Patient Position: Chair, Cuff Size: Adult Large)  Temp 98.6  F (37  C)  Resp 18  Ht 5' 8.25\" (1.734 m)  Wt 191 lb (86.6 kg)  BMI 28.83 kg/m2 Estimated body mass index is 28.83 kg/(m^2) as calculated from the following:    Height as of this encounter: 5' 8.25\" (1.734 m).    Weight as of this encounter: 191 lb (86.6 kg).  Medication Reconciliation: complete   Rena Smith CMA      "

## 2018-02-27 ENCOUNTER — PRE VISIT (OUTPATIENT)
Dept: ORTHOPEDICS | Facility: CLINIC | Age: 73
End: 2018-02-27

## 2018-02-27 NOTE — TELEPHONE ENCOUNTER
Discussed with patient reason for visit Right Hip pain  Patient prepared for appointment through the followin. Have you had any recent xrays in the last 6 months? Will hand carry disk from [employer]    2. Have you had an MRI? No.     3. Have you had any surgery in past related to complaint?  No.  If yes, Patient advised that implant stickers are needed for any previous total joint surgery as well for appointment.     4. Are you being referred by another provider? Yes: Terence Thornton  If yes-Records in Epic.    5. Have you received the intake form in mail?.No.   Pt reminded if intake has not been received before appointment to arrive an additional 20 minutes early to appointment.     6. Is this work comp or MVA related? No.    Kylah Michael RN

## 2018-03-01 ENCOUNTER — OFFICE VISIT (OUTPATIENT)
Dept: SURGERY | Facility: CLINIC | Age: 73
End: 2018-03-01
Payer: COMMERCIAL

## 2018-03-01 ENCOUNTER — TELEPHONE (OUTPATIENT)
Dept: SURGERY | Facility: CLINIC | Age: 73
End: 2018-03-01

## 2018-03-01 VITALS
WEIGHT: 191 LBS | DIASTOLIC BLOOD PRESSURE: 86 MMHG | HEART RATE: 64 BPM | SYSTOLIC BLOOD PRESSURE: 131 MMHG | TEMPERATURE: 98.3 F | HEIGHT: 68 IN | BODY MASS INDEX: 28.95 KG/M2

## 2018-03-01 DIAGNOSIS — Z01.818 PRE-OP EVALUATION: ICD-10-CM

## 2018-03-01 DIAGNOSIS — K64.8 INTERNAL HEMORRHOIDS: Primary | ICD-10-CM

## 2018-03-01 PROCEDURE — 99204 OFFICE O/P NEW MOD 45 MIN: CPT | Performed by: SURGERY

## 2018-03-01 PROCEDURE — 93000 ELECTROCARDIOGRAM COMPLETE: CPT | Performed by: SURGERY

## 2018-03-01 NOTE — PROGRESS NOTES
Pt comes to see me for bleeding internal hemorrhoids.  He would occasionally have bleeding with BM's, but feels that it is occurring frequently enough to get them treated.  He would have blood in the toilet and on the wipes.  He has no pain with BM's.  He denies any tissue that he has to reduce with BM's.  He had a colonoscopy done 5 years ago that was normal.  The bleeding, he has had off and on for many years.    Past Medical History:   Diagnosis Date     Elevated prostate specific antigen (PSA)     High Prostate     Other affections of shoulder region, not elsewhere classified     Right Shoulder Impingement     Other chronic sinusitis     Chronic Sinusitis     Pure hypercholesterolemia     High Chol     Varicose veins of legs, with delivery, with or without mention of antepartum condition     Varicose Vein     Past Surgical History:   Procedure Laterality Date     COLONOSCOPY  5/9/2007     FLEXIBLE SIGMOIDOSCOPY  3/27/2001    Flexible Sigmoidoscopy to 55 cm.     OSTEOTOMY FOOT  3/27/2014    Procedure: OSTEOTOMY FOOT;  Left Foot 1st Metatarsal Corrective Osteotomy, 3rd Toe Correction;  Surgeon: Delroy Teixeira DPM;  Location: [facility] OR     SURGICAL HISTORY OF -   12/12/2002    Arthroscopic subacromial decompression     SURGICAL HISTORY OF -   1982    Right Ankle Repair for bone chips     SURGICAL HISTORY OF -   3/2009    lipoma on back     Social History     Social History     Marital status:      Spouse name: N/A     Number of children: N/A     Years of education: N/A     Occupational History     Not on file.     Social History Main Topics     Smoking status: Former Smoker     Smokeless tobacco: Never Used     Alcohol use Yes      Comment: 1-2 roberto every other week     Drug use: No     Sexual activity: Yes     Partners: Female     Other Topics Concern     Parent/Sibling W/ Cabg, Mi Or Angioplasty Before 65f 55m? No     Social History Narrative     Family History   Problem Relation Age of Onset     CANCER  Mother      lung     CANCER Father      lung     Breast Cancer Sister      Asthma No family hx of      C.A.D. No family hx of      DIABETES No family hx of      Hypertension No family hx of      CEREBROVASCULAR DISEASE No family hx of      Cancer - colorectal No family hx of      Prostate Cancer No family hx of      Current Outpatient Prescriptions   Medication     hydrocortisone (ANUSOL-HC) 2.5 % cream     hydrOXYzine (ATARAX) 25 MG tablet     simvastatin (ZOCOR) 20 MG tablet     tadalafil (CIALIS) 10 MG tablet     Acetaminophen (TYLENOL PO)     IBUPROFEN PO     diflorasone (PSORCON) 0.05 % ointment     BENADRYL 25 MG OR TABS     MICONAZOLE     No current facility-administered medications for this visit.         Allergies   Allergen Reactions     Cephalosporins      Dicloxacillin      Garamycin [Aminoglycosides]      6/95 PCN-Hosp overnight     Penicillin [Esters]      Zithromax [Azithromycin]      Itchy       ROS: 10 point ROS neg other than the symptoms noted above in the HPI.    Physical Exam   Constitutional: He is oriented to person, place, and time and well-developed, well-nourished, and in no distress. No distress.   HENT:   Head: Normocephalic and atraumatic.   Eyes: EOM are normal.   Cardiovascular: Normal rate, regular rhythm and normal heart sounds.    Pulmonary/Chest: Effort normal and breath sounds normal.   Genitourinary:   Genitourinary Comments: Anal exam:  No obvious large external hemorrhoids seen.   Neurological: He is alert and oriented to person, place, and time.   Skin: Skin is warm and dry. He is not diaphoretic.   Psychiatric: Mood, memory, affect and judgment normal.     EKG done in the office showed a right bundle branch block.    A/P:  Bleeding internal hemorrhoids.  I discussed exam under anesthesia and hemorrhoidectomy with him.  Risks and benefits were explained, including but not limited to bleeding, infection, and anesthesia. He seems to understand and consented to proceed with the  procedure. Surgery will be scheduled as soon as possible.    Since he had an abnormal EKG in our office, I've asked him to follow up with Dr. Thornton for a pre op H and PEdwin Otto MD, FACS

## 2018-03-01 NOTE — NURSING NOTE
"Initial /86 (BP Location: Right arm, Patient Position: Sitting, Cuff Size: Adult Regular)  Pulse 64  Temp 98.3  F (36.8  C) (Oral)  Ht 1.734 m (5' 8.25\")  Wt 86.6 kg (191 lb)  BMI 28.83 kg/m2 Estimated body mass index is 28.83 kg/(m^2) as calculated from the following:    Height as of this encounter: 1.734 m (5' 8.25\").    Weight as of this encounter: 86.6 kg (191 lb). .    Zenaida Barrera MA    "

## 2018-03-01 NOTE — LETTER
3/1/2018         RE: Karlos Wood  [address] [address]        Dear Colleague,    Thank you for referring your patient, Karlos Wood, to the [facility]. Please see a copy of my visit note below.    Pt comes to see me for bleeding internal hemorrhoids.  He would occasionally have bleeding with BM's, but feels that it is occurring frequently enough to get them treated.  He would have blood in the toilet and on the wipes.  He has no pain with BM's.  He denies any tissue that he has to reduce with BM's.  He had a colonoscopy done 5 years ago that was normal.  The bleeding, he has had off and on for many years.    Past Medical History:   Diagnosis Date     Elevated prostate specific antigen (PSA)     High Prostate     Other affections of shoulder region, not elsewhere classified     Right Shoulder Impingement     Other chronic sinusitis     Chronic Sinusitis     Pure hypercholesterolemia     High Chol     Varicose veins of legs, with delivery, with or without mention of antepartum condition     Varicose Vein     Past Surgical History:   Procedure Laterality Date     COLONOSCOPY  5/9/2007     FLEXIBLE SIGMOIDOSCOPY  3/27/2001    Flexible Sigmoidoscopy to 55 cm.     OSTEOTOMY FOOT  3/27/2014    Procedure: OSTEOTOMY FOOT;  Left Foot 1st Metatarsal Corrective Osteotomy, 3rd Toe Correction;  Surgeon: Delroy Teixeira DPM;  Location: [facility] OR     SURGICAL HISTORY OF -   12/12/2002    Arthroscopic subacromial decompression     SURGICAL HISTORY OF -   1982    Right Ankle Repair for bone chips     SURGICAL HISTORY OF -   3/2009    lipoma on back     Social History     Social History     Marital status:      Spouse name: N/A     Number of children: N/A     Years of education: N/A     Occupational History     Not on file.     Social History Main Topics     Smoking status: Former Smoker     Smokeless tobacco: Never Used     Alcohol use Yes      Comment: 1-2 roberto every other week      Drug use: No     Sexual activity: Yes     Partners: Female     Other Topics Concern     Parent/Sibling W/ Cabg, Mi Or Angioplasty Before 65f 55m? No     Social History Narrative     Family History   Problem Relation Age of Onset     CANCER Mother      lung     CANCER Father      lung     Breast Cancer Sister      Asthma No family hx of      C.A.D. No family hx of      DIABETES No family hx of      Hypertension No family hx of      CEREBROVASCULAR DISEASE No family hx of      Cancer - colorectal No family hx of      Prostate Cancer No family hx of      Current Outpatient Prescriptions   Medication     hydrocortisone (ANUSOL-HC) 2.5 % cream     hydrOXYzine (ATARAX) 25 MG tablet     simvastatin (ZOCOR) 20 MG tablet     tadalafil (CIALIS) 10 MG tablet     Acetaminophen (TYLENOL PO)     IBUPROFEN PO     diflorasone (PSORCON) 0.05 % ointment     BENADRYL 25 MG OR TABS     MICONAZOLE     No current facility-administered medications for this visit.         Allergies   Allergen Reactions     Cephalosporins      Dicloxacillin      Garamycin [Aminoglycosides]      6/95 PCN-Hosp overnight     Penicillin [Esters]      Zithromax [Azithromycin]      Itchy       ROS: 10 point ROS neg other than the symptoms noted above in the HPI.    Physical Exam   Constitutional: He is oriented to person, place, and time and well-developed, well-nourished, and in no distress. No distress.   HENT:   Head: Normocephalic and atraumatic.   Eyes: EOM are normal.   Cardiovascular: Normal rate, regular rhythm and normal heart sounds.    Pulmonary/Chest: Effort normal and breath sounds normal.   Genitourinary:   Genitourinary Comments: Anal exam:  No obvious large external hemorrhoids seen.   Neurological: He is alert and oriented to person, place, and time.   Skin: Skin is warm and dry. He is not diaphoretic.   Psychiatric: Mood, memory, affect and judgment normal.     EKG done in the office showed a right bundle branch block.    A/P:  Bleeding internal  hemorrhoids.  I discussed exam under anesthesia and hemorrhoidectomy with him.  Risks and benefits were explained, including but not limited to bleeding, infection, and anesthesia. He seems to understand and consented to proceed with the procedure. Surgery will be scheduled as soon as possible.    Since he had an abnormal EKG in our office, I've asked him to follow up with Dr. Thornton for a pre op H and P.    Johann Otto MD, FACS        Again, thank you for allowing me to participate in the care of your patient.        Sincerely,        Justo Otto MD

## 2018-03-01 NOTE — TELEPHONE ENCOUNTER
Type of surgery: exam under anesthesia, hemorrhoidectomy  Location of surgery: [place] OR  Date and time of surgery: March 7th 2018 @ 2:15  Surgeon: Dr. Otto  Pre-Op Appt Date: 3-7-18 10:00 am Dr Thornton  Post-Op Appt Date: 3-20-18 10:00 am Dr Otto   Packet sent out: Yes  Pre-cert/Authorization completed:  Not Applicable  Date: 3/1/18  Zenaida Barrera MA

## 2018-03-01 NOTE — MR AVS SNAPSHOT
After Visit Summary   3/1/2018    Karlos Wood    MRN: [id number]           Patient Information     Date Of Birth          1945        Visit Information        Provider Department      3/1/2018 1:00 PM Justo Otto MD [facility]        Today's Diagnoses     Internal hemorrhoids    -  1    Pre-op evaluation          Care Instructions    Per Dr. Otto's instructions          Follow-ups after your visit        Your next 10 appointments already scheduled     Mar 05, 2018 10:00 AM CST   Pre-Op physical with Terence Thornton MD   [facility]    [address]  [facility] [address]   [phone]            Mar 06, 2018  9:00 AM CST   New Visit with Sarah Patton PA-C   [facility]    [address] [place] [address]   [phone]            Mar 06, 2018 11:00 AM CST   New Visit with Orlando Sidhu MD   [facility]    [address] [place] [address] [phone]            Mar 07, 2018   Procedure with Justo Otto MD   [facility] (--)    [address] [place] [address]   [phone]           The medical center is located at [address] [place] [address]. (between [address] and [facility] [address] in [place], [place] miles north of [place]).            Mar 20, 2018 10:00 AM CDT   Return Visit with Justo Otto MD   [facility] ([facility]    [address] [place] [address]   [phone]              Who to contact     If you have questions or need follow up information about today's clinic visit or your schedule please contact [facility] directly at [phone].  Normal or non-critical lab and imaging results will be communicated to you by MyChart, letter or phone within 4 business days after the  "clinic has received the results. If you do not hear from us within 7 days, please contact the clinic through [employer] or phone. If you have a critical or abnormal lab result, we will notify you by phone as soon as possible.  Submit refill requests through [employer] or call your pharmacy and they will forward the refill request to us. Please allow 3 business days for your refill to be completed.          Additional Information About Your Visit        [employer] Information     [employer] lets you send messages to your doctor, view your test results, renew your prescriptions, schedule appointments and more. To sign up, go to [email or web address] . Click on \"Log in\" on the left side of the screen, which will take you to the Welcome page. Then click on \"Sign up Now\" on the right side of the page.     You will be asked to enter the access code listed below, as well as some personal information. Please follow the directions to create your username and password.     Your access code is: [email or web address]  Expires: 2018  9:12 AM     Your access code will  in 90 days. If you need help or a new code, please call your [facility] clinic or [phone].        Care EveryWhere ID     This is your Care EveryWhere ID. This could be used by other organizations to access your [facility] medical records  [id number]        Your Vitals Were     Pulse Temperature Height BMI (Body Mass Index)          64 98.3  F (36.8  C) (Oral) 1.734 m (5' 8.25\") 28.83 kg/m2         Blood Pressure from Last 3 Encounters:   18 131/86   18 137/77   18 126/68    Weight from Last 3 Encounters:   18 86.6 kg (191 lb)   18 86.6 kg (191 lb)   18 87 kg (191 lb 12.8 oz)              We Performed the Following     EKG 12-lead complete w/read - Clinics     Cathie-Operative Worksheet        Primary Care Provider Office Phone # Fax #    Terence Thornton [phone] [address] [facility] [address]      "   Equal Access to Services     [facility] : Hadii lizbeth christie nkechibrittani Collin, waashokda luqadaha, qaybta kakarentrisha estrella, naomi fernandez. So [facility] [phone].    ATENCIÓN: Si habla español, tiene a arora disposición servicios gratuitos de asistencia lingüística. Shannoname al [phone].    We comply with applicable federal civil rights laws and [place] laws. We do not discriminate on the basis of race, color, national origin, age, disability, sex, sexual orientation, or gender identity.            Thank you!     Thank you for choosing [facility]  for your care. Our goal is always to provide you with excellent care. Hearing back from our patients is one way we can continue to improve our services. Please take a few minutes to complete the written survey that you may receive in the mail after your visit with us. Thank you!             Your Updated Medication List - Protect others around you: Learn how to safely use, store and throw away your medicines at www.disposemymeds.org.          This list is accurate as of 3/1/18 11:59 PM.  Always use your most recent med list.                   Brand Name Dispense Instructions for use Diagnosis    BENADRYL 25 MG tablet   Generic drug:  diphenhydrAMINE     56    1 TABLET EVERY 4 TO 6 HOURS AS NEEDED        CIALIS 10 MG tablet   Generic drug:  tadalafil      Take by mouth daily as needed for erectile dysfunction        diflorasone 0.05 % ointment    PSORCON    30 g    Apply to Leg eczema once daily as needed.    Eczema       hydrocortisone 2.5 % cream    ANUSOL-HC    30 g    Place rectally 2 times daily    External hemorrhoids       hydrOXYzine 25 MG tablet    ATARAX    60 tablet    Take 1-2 tablets (25-50 mg) by mouth every 6 hours as needed for itching    Rash       IBUPROFEN PO      Take 400 mg by mouth every 6 hours as needed for moderate pain        MICONAZOLE      applying to toes PRN        simvastatin 20 MG tablet    ZOCOR    90  tablet    Take 1 tablet (20 mg) by mouth At Bedtime    Hyperlipidemia LDL goal <130       TYLENOL PO      Take 500 mg by mouth

## 2018-03-05 ENCOUNTER — OFFICE VISIT (OUTPATIENT)
Dept: FAMILY MEDICINE | Facility: CLINIC | Age: 73
End: 2018-03-05
Payer: COMMERCIAL

## 2018-03-05 VITALS
TEMPERATURE: 98.3 F | SYSTOLIC BLOOD PRESSURE: 128 MMHG | RESPIRATION RATE: 18 BRPM | WEIGHT: 192 LBS | OXYGEN SATURATION: 98 % | HEIGHT: 68 IN | HEART RATE: 68 BPM | BODY MASS INDEX: 29.1 KG/M2 | DIASTOLIC BLOOD PRESSURE: 79 MMHG

## 2018-03-05 DIAGNOSIS — E78.5 HYPERLIPIDEMIA LDL GOAL <130: ICD-10-CM

## 2018-03-05 DIAGNOSIS — Z71.89 ADVANCED DIRECTIVES, COUNSELING/DISCUSSION: ICD-10-CM

## 2018-03-05 DIAGNOSIS — K64.9 HEMORRHOIDS, UNSPECIFIED HEMORRHOID TYPE: ICD-10-CM

## 2018-03-05 DIAGNOSIS — K63.5 POLYP OF COLON, UNSPECIFIED PART OF COLON, UNSPECIFIED TYPE: ICD-10-CM

## 2018-03-05 DIAGNOSIS — M25.519 SHOULDER PAIN, UNSPECIFIED CHRONICITY, UNSPECIFIED LATERALITY: ICD-10-CM

## 2018-03-05 DIAGNOSIS — Z76.89 HEALTH CARE HOME: ICD-10-CM

## 2018-03-05 DIAGNOSIS — M21.619 BUNION: ICD-10-CM

## 2018-03-05 DIAGNOSIS — D17.30 LIPOMA OF SKIN AND SUBCUTANEOUS TISSUE: ICD-10-CM

## 2018-03-05 DIAGNOSIS — I45.10 RBBB (RIGHT BUNDLE BRANCH BLOCK): ICD-10-CM

## 2018-03-05 DIAGNOSIS — M70.61 TROCHANTERIC BURSITIS OF RIGHT HIP: ICD-10-CM

## 2018-03-05 DIAGNOSIS — Z01.818 PREOP GENERAL PHYSICAL EXAM: Primary | ICD-10-CM

## 2018-03-05 PROCEDURE — 99214 OFFICE O/P EST MOD 30 MIN: CPT | Performed by: FAMILY MEDICINE

## 2018-03-05 NOTE — MR AVS SNAPSHOT
After Visit Summary   3/5/2018    Karlos Wood    MRN: [id number]           Patient Information     Date Of Birth          1945        Visit Information        Provider Department      3/5/2018 10:00 AM Terence Thornton MD [facility]        Today's Diagnoses     Preop general physical exam    -  1    Hemorrhoids, unspecified hemorrhoid type        RBBB (right bundle branch block)        Lipoma of skin and subcutaneous tissue        Hyperlipidemia LDL goal <130        Shoulder pain, unspecified chronicity, unspecified laterality        Trochanteric bursitis of right hip        Advanced directives, counseling/discussion        Health Care Home        Bunion        Polyp of colon, unspecified part of colon, unspecified type          Care Instructions      Before Your Surgery      Call your surgeon if there is any change in your health. This includes signs of a cold or flu (such as a sore throat, runny nose, cough, rash or fever).    Do not smoke, drink alcohol or take over the counter medicine (unless your surgeon or primary care doctor tells you to) for the 24 hours before and after surgery.    If you take prescribed drugs: Follow your doctor s orders about which medicines to take and which to stop until after surgery.    Eating and drinking prior to surgery: follow the instructions from your surgeon    Take a shower or bath the night before surgery. Use the soap your surgeon gave you to gently clean your skin. If you do not have soap from your surgeon, use your regular soap. Do not shave or scrub the surgery site.  Wear clean pajamas and have clean sheets on your bed.           Follow-ups after your visit        Your next 10 appointments already scheduled     Mar 06, 2018  8:45 AM CST   Ech Complete with CAMRYN   [facility] ([facility])    [address] [facility] [address]   [phone]           1. Please bring or wear a  comfortable two-piece outfit. 2. You may eat, drink and take your normal medicines. 3. For any questions that cannot be answered, please contact the ordering physician            Mar 06, 2018  9:00 AM CST   New Visit with Sarah Patton PA-C   [facility]    [address] [facility] [address] [phone]            Mar 06, 2018 11:00 AM CST   [facility]hu [facility]   [facility]    [address] [place] [id number] [phone]            Mar 07, 2018   Procedure with Justo Otto MD   [facility] (--)    [address] [place] [address] [phone]           The medical center is located at [address] [place] [address]. (between [address] in [place], four miles north of [place]).            Mar 20, 2018 10:00 AM CDT   Return Visit with Justo Otto MD   [facility]    [address] [place] [address] [phone]              Future tests that were ordered for you today     Open Future Orders        Priority Expected Expires Ordered    Echocardiogram Complete Routine  3/5/2019 3/5/2018            Who to contact     If you have questions or need follow up information about today's clinic visit or your schedule please contact [facility] directly at [phone].  Normal or non-critical lab and imaging results will be communicated to you by MyChart, letter or phone within 4 business days after the clinic has received the results. If you do not hear from us within 7 days, please contact the clinic through MyChart or phone. If you have a critical or abnormal lab result, we will notify you by phone as soon as possible.  Submit refill requests through [employer] or call your pharmacy and they will forward the refill request to us. Please allow 3 business days  "for your refill to be completed.          Additional Information About Your Visit        [employer] Information     [employer] lets you send messages to your doctor, view your test results, renew your prescriptions, schedule appointments and more. To sign up, go to [email or web address] . Click on \"Log in\" on the left side of the screen, which will take you to the Welcome page. Then click on \"Sign up Now\" on the right side of the page.     You will be asked to enter the access code listed below, as well as some personal information. Please follow the directions to create your username and password.     Your access code is: [email or web address]  Expires: 2018  9:12 AM     Your access code will  in 90 days. If you need help or a new code, please call your [facility] clinic or [phone].        Care EveryWhere ID     This is your [facility] EveryWhere ID. This could be used by other organizations to access your [facility] medical records  [id number]        Your Vitals Were     Pulse Temperature Respirations Height Pulse Oximetry BMI (Body Mass Index)    68 98.3  F (36.8  C) 18 5' 8.25\" (1.734 m) 98% 28.98 kg/m2       Blood Pressure from Last 3 Encounters:   18 128/79   18 131/86   18 137/77    Weight from Last 3 Encounters:   18 192 lb (87.1 kg)   18 191 lb (86.6 kg)   18 191 lb (86.6 kg)               Primary Care Provider Office Phone # Fax #    Terence Thornton [phone] [address] [facility] [address]        Equal Access to Services     [facility] AH: Hadheri Polanco, waashokda majo, qababarta kaalmada delores, naomi fernandez. So [facility] [phone].    ATENCIÓN: Si habla español, tiene a arora disposición servicios gratuitos de asistencia lingüística. Llame al [phone].    We comply with applicable federal civil rights laws and [place] laws. We do not discriminate on the basis of race, color, national origin, " age, disability, sex, sexual orientation, or gender identity.            Thank you!     Thank you for choosing [facility]  for your care. Our goal is always to provide you with excellent care. Hearing back from our patients is one way we can continue to improve our services. Please take a few minutes to complete the written survey that you may receive in the mail after your visit with us. Thank you!             Your Updated Medication List - Protect others around you: Learn how to safely use, store and throw away your medicines at www.disposemymeds.org.          This list is accurate as of 3/5/18 11:27 AM.  Always use your most recent med list.                   Brand Name Dispense Instructions for use Diagnosis    BENADRYL 25 MG tablet   Generic drug:  diphenhydrAMINE     56    1 TABLET EVERY 4 TO 6 HOURS AS NEEDED        CIALIS 10 MG tablet   Generic drug:  tadalafil      Take by mouth daily as needed for erectile dysfunction        diflorasone 0.05 % ointment    PSORCON    30 g    Apply to Leg eczema once daily as needed.    Eczema       hydrocortisone 2.5 % cream    ANUSOL-HC    30 g    Place rectally 2 times daily    External hemorrhoids       hydrOXYzine 25 MG tablet    ATARAX    60 tablet    Take 1-2 tablets (25-50 mg) by mouth every 6 hours as needed for itching    Rash       IBUPROFEN PO      Take 400 mg by mouth every 6 hours as needed for moderate pain        MICONAZOLE      applying to toes PRN        simvastatin 20 MG tablet    ZOCOR    90 tablet    Take 1 tablet (20 mg) by mouth At Bedtime    Hyperlipidemia LDL goal <130       TYLENOL PO      Take 500 mg by mouth

## 2018-03-05 NOTE — NURSING NOTE
"Chief Complaint   Patient presents with     Pre-Op Exam       Initial /87  Pulse 68  Temp 98.3  F (36.8  C)  Resp 18  Ht 5' 8.25\" (1.734 m)  Wt 192 lb (87.1 kg)  SpO2 98%  BMI 28.98 kg/m2 Estimated body mass index is 28.98 kg/(m^2) as calculated from the following:    Height as of this encounter: 5' 8.25\" (1.734 m).    Weight as of this encounter: 192 lb (87.1 kg).  Medication Reconciliation: complete   Rena Smith CMA      "

## 2018-03-05 NOTE — PROGRESS NOTES
[facility]  [address]  [facility] [address]  [phone]  Dept: [phone]    PRE-OP EVALUATION:  Today's date: 3/5/2018    Karlos Wood (: 1945) presents for pre-operative evaluation assessment as requested by Justo Luo MD .  He requires evaluation and anesthesia risk assessment prior to undergoing surgery/procedure for treatment of .HEMORRHOIDs INTERNAL    Proposed Surgery/ Procedure: HEMORRHOIDECTOMY  Date of Surgery/ Procedure: 3/7/18  Time of Surgery/ Procedure: 2:15  Hospital[facility]Surgical [facility]   Fax number for surgical facility:   Primary Physician: Terence Thornton  Type of Anesthesia Anticipated: Monitor Anesthesia Care    Patient has a Health Care Directive or Living Will:  NO    1. NO - Do you have a history of heart attack, stroke, stent, bypass or surgery on an artery in the head, neck, heart or legs?  2. NO - Do you ever have any pain or discomfort in your chest?  3. NO - Do you have a history of  Heart Failure?  4. NO - Are you troubled by shortness of breath when: walking on the level, up a slight hill or at night?  5. NO - Do you currently have a cold, bronchitis or other respiratory infection?  6. NO - Do you have a cough, shortness of breath or wheezing?  7. NO - Do you sometimes get pains in the calves of your legs when you walk?  8. NO - Do you or anyone in your family have previous history of blood clots?  9. NO - Do you or does anyone in your family have a serious bleeding problem such as prolonged bleeding following surgeries or cuts?  10. NO - Have you ever had problems with anemia or been told to take iron pills?  11. NO - Have you had any abnormal blood loss such as black, tarry or bloody stools, or abnormal vaginal bleeding?  12. NO - Have you ever had a blood transfusion?  13. NO - Have you or any of your relatives ever had problems with anesthesia?  14. NO - Do you have sleep apnea, excessive snoring or  daytime drowsiness?  15. NO - Do you have any prosthetic heart valves?  16. NO - Do you have prosthetic joints?  17. NO - Is there any chance that you may be pregnant?      HPI:     HPI related to upcoming procedure:       He is scheduled for hemorrhoid surgery. The surgeon is requesting consultation regarding anesthesia and surgical risk for this patient with respect to current and past medical conditions.    MEDICAL HISTORY:     Patient Active Problem List    Diagnosis Date Noted     Colon polyps 09/26/2014     Priority: Medium     Benign tubular adenomatous polyps - repeat colonoscopy in 5 years       Bunion 07/17/2013     Priority: Medium     Health Care Home 12/14/2012     Priority: Medium     Marcie Carey RN-PHN  FPA / ANDREW [facility]   [phone]    DX V65.8 REPLACED WITH [id number] HEALTH CARE HOME (04/08/2013)       Advanced directives, counseling/discussion 09/25/2012     Priority: Medium     Patient does not have an Advance/Health Care Directive (HCD), requests blank HCD form.    Little Gutierrez  September 25, 2012         Pain in shoulder 04/19/2011     Priority: Medium     Varicose veins 04/19/2011     Priority: Medium     Trochanteric bursitis 04/19/2011     Priority: Medium     HYPERLIPIDEMIA LDL GOAL <130 10/31/2010     Priority: Medium     Lipoma of skin and subcutaneous tissue 03/20/2009     Priority: Medium      Past Medical History:   Diagnosis Date     Elevated prostate specific antigen (PSA)     High Prostate     Other affections of shoulder region, not elsewhere classified     Right Shoulder Impingement     Other chronic sinusitis     Chronic Sinusitis     Pure hypercholesterolemia     High Chol     Varicose veins of legs, with delivery, with or without mention of antepartum condition     Varicose Vein     Past Surgical History:   Procedure Laterality Date     COLONOSCOPY  5/9/2007     FLEXIBLE SIGMOIDOSCOPY  3/27/2001    Flexible Sigmoidoscopy to 55 cm.     OSTEOTOMY FOOT   "3/27/2014    Procedure: OSTEOTOMY FOOT;  Left Foot 1st Metatarsal Corrective Osteotomy, 3rd Toe Correction;  Surgeon: Delroy Teixeira DPM;  Location: [facility] OR     SURGICAL HISTORY OF -   12/12/2002    Arthroscopic subacromial decompression     SURGICAL HISTORY OF -   1982    Right Ankle Repair for bone chips     SURGICAL HISTORY OF -   3/2009    lipoma on back     Current Outpatient Prescriptions   Medication Sig Dispense Refill     hydrocortisone (ANUSOL-HC) 2.5 % cream Place rectally 2 times daily 30 g 0     hydrOXYzine (ATARAX) 25 MG tablet Take 1-2 tablets (25-50 mg) by mouth every 6 hours as needed for itching 60 tablet 1     simvastatin (ZOCOR) 20 MG tablet Take 1 tablet (20 mg) by mouth At Bedtime 90 tablet 3     tadalafil (CIALIS) 10 MG tablet Take by mouth daily as needed for erectile dysfunction       Acetaminophen (TYLENOL PO) Take 500 mg by mouth       diflorasone (PSORCON) 0.05 % ointment Apply to Leg eczema once daily as needed. 30 g 3     BENADRYL 25 MG OR TABS 1 TABLET EVERY 4 TO 6 HOURS AS NEEDED 56 0     MICONAZOLE applying to toes PRN       IBUPROFEN PO Take 400 mg by mouth every 6 hours as needed for moderate pain       OTC products: None, except as noted above    Allergies   Allergen Reactions     Cephalosporins      Dicloxacillin      Garamycin [Aminoglycosides]      6/95 PCN-Hosp overnight     Penicillin [Esters]      Zithromax [Azithromycin]      Itchy       Latex Allergy: NO    Social History   Substance Use Topics     Smoking status: Former Smoker     Smokeless tobacco: Never Used     Alcohol use Yes      Comment: 1-2 roberto every other week     History   Drug Use No       REVIEW OF SYSTEMS:   Constitutional, neuro, ENT, endocrine, pulmonary, cardiac, gastrointestinal, genitourinary, musculoskeletal, integument and psychiatric systems are negative, except as otherwise noted.    EXAM:   /87  Pulse 68  Temp 98.3  F (36.8  C)  Resp 18  Ht 5' 8.25\" (1.734 m)  Wt 192 lb (87.1 kg)  " SpO2 98%  BMI 28.98 kg/m2    GENERAL APPEARANCE: healthy, alert and no distress     EYES: EOMI,  PERRL     HENT: ear canals and TM's normal and nose and mouth without ulcers or lesions     NECK: no adenopathy, no asymmetry, masses, or scars and thyroid normal to palpation     RESP: lungs clear to auscultation - no rales, rhonchi or wheezes     CV: regular rates and rhythm, normal S1 S2, no S3 or S4 and no murmur, click or rub     ABDOMEN:  soft, nontender, no HSM or masses and bowel sounds normal     MS: extremities normal- no gross deformities noted, no evidence of inflammation in joints, FROM in all extremities.     SKIN: no suspicious lesions or rashes     NEURO: Normal strength and tone, sensory exam grossly normal, mentation intact and speech normal     PSYCH: mentation appears normal. and affect normal/bright     LYMPHATICS: No cervical adenopathy    DIAGNOSTICS:   EKG: Normal Sinus Rhythm, normal axis, normal intervals, no acute ST/T changes c/w ischemia, no LVH by voltage criteria, Right Bundle Branch Block, unchanged from previous tracings    Recent Labs   Lab Test  04/18/16   0840  04/27/15   0746   03/30/11   0835  04/28/10   0744   HGB   --    --    --   15.3  14.4   PLT   --    --    --   171  157   NA  142  140   < >  142  144   POTASSIUM  4.0  4.1   < >  4.6  4.3   CR  0.96  1.01   < >  1.15  1.07    < > = values in this interval not displayed.        IMPRESSION:   Reason for surgery/procedure: hemorrhoids  Diagnosis/reason for consult:    Preop general physical exam  Hemorrhoids, unspecified hemorrhoid type  RBBB (right bundle branch block)  Lipoma of skin and subcutaneous tissue  Hyperlipidemia LDL goal <130  Shoulder pain, unspecified chronicity, unspecified laterality  Trochanteric bursitis of right hip  Advanced directives, counseling/discussion  Health Care Home  Bunion  Polyp of colon, unspecified part of colon, unspecified type        The proposed surgical procedure is considered LOW  risk.    REVISED CARDIAC RISK INDEX  The patient has the following serious cardiovascular risks for perioperative complications such as (MI, PE, VFib and 3  AV Block):  No serious cardiac risks, right bundle branch block noted in an asymptomatic patient with no significant cardiac risk, echo pending  INTERPRETATION: 1 risks: Class II (low risk - 0.9% complication rate)    The patient has the following additional risks for perioperative complications:        ICD-10-CM    1. Preop general physical exam Z01.818        RECOMMENDATIONS:         --Patient is to take all scheduled medications on the day of surgery EXCEPT for modifications listed below.    Approval for surgery pending echocardiogram to rule out structural heart disease.  This is scheduled prior to surgery.       Signed Electronically by: Terence Thornton MD    Copy of this evaluation report is provided to requesting physician.    [facility] Preop Guidelines

## 2018-03-06 ENCOUNTER — ANESTHESIA EVENT (OUTPATIENT)
Dept: SURGERY | Facility: CLINIC | Age: 73
End: 2018-03-06
Payer: MEDICARE

## 2018-03-06 ENCOUNTER — OFFICE VISIT (OUTPATIENT)
Dept: ORTHOPEDICS | Facility: CLINIC | Age: 73
End: 2018-03-06
Payer: COMMERCIAL

## 2018-03-06 ENCOUNTER — HOSPITAL ENCOUNTER (OUTPATIENT)
Dept: CARDIOLOGY | Facility: CLINIC | Age: 73
Discharge: HOME | End: 2018-03-06
Attending: FAMILY MEDICINE | Admitting: FAMILY MEDICINE
Payer: MEDICARE

## 2018-03-06 VITALS — OXYGEN SATURATION: 96 % | DIASTOLIC BLOOD PRESSURE: 82 MMHG | HEART RATE: 64 BPM | SYSTOLIC BLOOD PRESSURE: 146 MMHG

## 2018-03-06 DIAGNOSIS — M25.551 HIP PAIN, RIGHT: Primary | ICD-10-CM

## 2018-03-06 DIAGNOSIS — I49.8 OTHER SPECIFIED CARDIAC ARRHYTHMIAS (CODE): ICD-10-CM

## 2018-03-06 DIAGNOSIS — I45.10 RBBB (RIGHT BUNDLE BRANCH BLOCK): ICD-10-CM

## 2018-03-06 PROCEDURE — 40000264 ECHO COMPLETE WITH OPTISON

## 2018-03-06 PROCEDURE — 99203 OFFICE O/P NEW LOW 30 MIN: CPT | Performed by: ORTHOPAEDIC SURGERY

## 2018-03-06 PROCEDURE — 25500064 ZZH RX 255 OP 636: Performed by: FAMILY MEDICINE

## 2018-03-06 PROCEDURE — 93306 TTE W/DOPPLER COMPLETE: CPT | Mod: 26 | Performed by: INTERNAL MEDICINE

## 2018-03-06 RX ADMIN — HUMAN ALBUMIN MICROSPHERES AND PERFLUTREN 2 ML: 10; .22 INJECTION, SOLUTION INTRAVENOUS at 09:35

## 2018-03-06 ASSESSMENT — LIFESTYLE VARIABLES: TOBACCO_USE: 1

## 2018-03-06 ASSESSMENT — PAIN SCALES - GENERAL: PAINLEVEL: MILD PAIN (3)

## 2018-03-06 NOTE — PATIENT INSTRUCTIONS
Thanks for coming today.  [facility]  [address] [place] [address]    To schedule future appointments in Ortho Clinic, you may call [phone].    To schedule ordered imaging by your provider:   Call Central Imaging [phone]    To schedule an injection ordered by your provider:  Call Central Imaging Injection scheduling line: [phone]  [employer] available online at:  [email or web address]    Please call if any further questions or concerns ([phone]).  Clinic hours 8 am to 5 pm.    Return to clinic (call) if symptoms worsen or fail to improve.

## 2018-03-06 NOTE — LETTER
3/6/2018         RE: Karlos Wood  [address] [facility] [address]        Dear Colleague,    Thank you for referring your patient, Karlos Wood, to the [facility]. Please see a copy of my visit note below.    Chief Complaint: Consult (trochanteric bursitis R hip/Terence Thornton)    Physician:  Terence Thornton    HPI: Karlos Wood is a 72 year old male who presents today for evaluation of his right hip.     Symptom Profile  Location of symptoms:  Lateral at the level of the greater troch   Onset: insidious  Duration of symptoms: multiple years   Quality of symptoms: aching   Severity: moderate   Alleviate:activity modification, sitting   Exacerbating: activities, walking   Previous Treatments: Previous treatments include activity modification, oral pain medication, GT injections that have improved his symptoms short term. Has not worked with a physical therapist.     Current Status:  Results of the patient s Hip Disability and Osteoarthritis Outcome Score (HOOS)  are as follows (0-100 scales with 100 being the theoretical best):  Pain:  Symptoms:  ADLs:  Sports/Recreation:  Quality of Life:  (http://koos.nu/)  UCLA Activity Score:    MEDICAL HISTORY:   Past Medical History:   Diagnosis Date     Elevated prostate specific antigen (PSA)     High Prostate     Other affections of shoulder region, not elsewhere classified     Right Shoulder Impingement     Other chronic sinusitis     Chronic Sinusitis     Pure hypercholesterolemia     High Chol     Varicose veins of legs, with delivery, with or without mention of antepartum condition     Varicose Vein       Pertinent negatives:  Patient has no history of DVT or PE. Discussed risk factors.    Medications:     Current Outpatient Prescriptions:      hydrocortisone (ANUSOL-HC) 2.5 % cream, Place rectally 2 times daily, Disp: 30 g, Rfl: 0     hydrOXYzine (ATARAX) 25 MG tablet, Take 1-2 tablets (25-50 mg) by mouth every 6 hours as needed for  itching, Disp: 60 tablet, Rfl: 1     simvastatin (ZOCOR) 20 MG tablet, Take 1 tablet (20 mg) by mouth At Bedtime, Disp: 90 tablet, Rfl: 3     tadalafil (CIALIS) 10 MG tablet, Take by mouth daily as needed for erectile dysfunction, Disp: , Rfl:      Acetaminophen (TYLENOL PO), Take 500 mg by mouth, Disp: , Rfl:      IBUPROFEN PO, Take 400 mg by mouth every 6 hours as needed for moderate pain, Disp: , Rfl:      diflorasone (PSORCON) 0.05 % ointment, Apply to Leg eczema once daily as needed., Disp: 30 g, Rfl: 3     BENADRYL 25 MG OR TABS, 1 TABLET EVERY 4 TO 6 HOURS AS NEEDED, Disp: 56, Rfl: 0     MICONAZOLE, applying to toes PRN, Disp: , Rfl:     Allergies: Cephalosporins; Dicloxacillin; Garamycin [aminoglycosides]; Penicillin [esters]; and Zithromax [azithromycin]    SURGICAL HISTORY:   Past Surgical History:   Procedure Laterality Date     COLONOSCOPY  5/9/2007     FLEXIBLE SIGMOIDOSCOPY  3/27/2001    Flexible Sigmoidoscopy to 55 cm.     OSTEOTOMY FOOT  3/27/2014    Procedure: OSTEOTOMY FOOT;  Left Foot 1st Metatarsal Corrective Osteotomy, 3rd Toe Correction;  Surgeon: Delroy Teixeira DPM;  Location: [facility] OR     SURGICAL HISTORY OF -   12/12/2002    Arthroscopic subacromial decompression     SURGICAL HISTORY OF -   1982    Right Ankle Repair for bone chips     SURGICAL HISTORY OF -   3/2009    lipoma on back       FAMILY HISTORY:   Family History   Problem Relation Age of Onset     CANCER Mother      lung     CANCER Father      lung     Breast Cancer Sister      Asthma No family hx of      C.A.D. No family hx of      DIABETES No family hx of      Hypertension No family hx of      CEREBROVASCULAR DISEASE No family hx of      Cancer - colorectal No family hx of      Prostate Cancer No family hx of        SOCIAL HISTORY:   Social History   Substance Use Topics     Smoking status: Former Smoker     Smokeless tobacco: Never Used     Alcohol use Yes      Comment: 1-2 roberto every other week   retired  , three  adult children, lives with spouse     REVIEW OF SYSTEMS:  The comprehensive review of systems from the intake form was reviewed with the patient.  No fever, weight change or fatigue. No dry eyes. No oral ulcers, sore throat or voice change. No palpitations, syncope, angina or edema.  No chest pain, excessive sleepiness, shortness of breath or hemoptysis.   No abdominal pain, nausea, vomiting, diarrhea or heartburn.  No skin rash. No focal weakness or numbness. No bleeding or lymphadenopathy. No rhinitis or hives.     Exam:  On physical examination the patient appears the stated age, is in no acute distress, affectThe is appropriate, and breathing is non-labored.  Vitals are documented in the EMR and have been reviewed:    /82 (BP Location: Left arm)  Pulse 64  SpO2 96%  Data Unavailable  There is no height or weight on file to calculate BMI.    Rises from chair: easily   Gait: normal   Hip ROM and fluid and painless. Symptoms are reproduced with palpation at the level of the greater trochanter.     X-rays:   Tonnis grade 1     Assessment and Plan: This is a 72 year old with greater trochanter pain syndrome. We discussed the diagnosis and the treatment options, that the options are limited, and that I do not recommend operative management for the problem. I counseled that one or two injections is common but past this I have concerns about their affects on the abductors and would not recommend additional injections in his case. We discussed an exercise program and a referral to a physical therapist was written. He will follow-up as needed with non-operative orthopaedics      Again, thank you for allowing me to participate in the care of your patient.        Sincerely,        Orlando Sidhu MD

## 2018-03-06 NOTE — NURSING NOTE
"Karlos Wood's goals for this visit include: trochanteric bursitis R hip/Terence Thornton  He requests these members of his care team be copied on today's visit information: Yes    PCP: Terence Thornton    Referring Provider:  Terence Thornton MD  [address] [place] [address]    Chief Complaint   Patient presents with     Consult     trochanteric bursitis R hip/Terence Thornton       Initial There were no vitals taken for this visit. Estimated body mass index is 28.98 kg/(m^2) as calculated from the following:    Height as of 3/5/18: 1.734 m (5' 8.25\").    Weight as of 3/5/18: 87.1 kg (192 lb).  Medication Reconciliation: complete   Ann Marie Eubanks CMA      "

## 2018-03-06 NOTE — MR AVS SNAPSHOT
After Visit Summary   3/6/2018    Karlos Wood    MRN: [id number]           Patient Information     Date Of Birth          1945        Visit Information        Provider Department      3/6/2018 11:00 AM Orlando Sidhu MD [facility]        Today's Diagnoses     Hip pain, right    -  1      Care Instructions    Thanks for coming today.  Ortho/Sports Medicine Clinic  [address] [place] [address]    To schedule future appointments in Ortho Clinic, you may call [phone].    To schedule ordered imaging by your provider:   Call Central Imaging [phone]    To schedule an injection ordered by your provider:  Call Central Imaging Injection scheduling line: [phone]  Viedeahart available online at:  [email or web address]    Please call if any further questions or concerns ([phone]).  Clinic hours 8 am to 5 pm.    Return to clinic (call) if symptoms worsen or fail to improve.            Follow-ups after your visit        Additional Services     PHYSICAL THERAPY REFERRAL       *This therapy referral will be filtered to a centralized scheduling office at [facility] and the patient will receive a call to schedule an appointment at a [facility] location most convenient for them. *     [facility] provides Physical Therapy evaluation and treatment and many specialty services across the [facility] system.  If requesting a specialty program, please choose from the list below.    If you have not heard from the scheduling office within 2 business days, please call [phone] for all locations, with the exception of [place], please call [phone] and [facility], please call [phone]  Treatment: Evaluation & Treatment  Special Instructions/Modalities: Greater trochanter program  Special Programs: None    Please be aware that coverage of these services is subject to the terms and limitations of your  "health insurance plan.  Call member services at your health plan with any benefit or coverage questions.      **Note to Provider:  If you are referring outside of [facility] for the therapy appointment, please list the name of the location in the \"special instructions\" above, print the referral and give to the patient to schedule the appointment.                  Your next 10 appointments already scheduled     Mar 07, 2018   Procedure with Justo Otto MD   [facility]    [address] [place] [address]   [phone]           The medical center is located at [address] [facility] [address] [facility]. (between [address] in [place], [place] north of [place]).            Mar 20, 2018 10:00 AM CDT   Return Visit with Justo Otto MD   [facility]    [facility] [facility] [address]   [phone]            Mar 20, 2018 10:45 AM CDT   New Visit with Cristino Barron MD   [facility] ([facility])    [address] [place] [address]   [phone]              Future tests that were ordered for you today     Open Future Orders        Priority Expected Expires Ordered    ECHO COMPLETE WITH [facility] Routine  3/5/2019 3/5/2018            Who to contact     If you have questions or need follow up information about today's clinic visit or your schedule please contact [facility] directly at [phone].  Normal or non-critical lab and imaging results will be communicated to you by Cybitshart, letter or phone within 4 business days after the clinic has received the results. If you do not hear from us within 7 days, please contact the clinic through Cybitshart or phone. If you have a critical or abnormal lab result, we will notify you by phone as soon as possible.  Submit refill requests through Influitive or call your pharmacy and they will forward the refill request to " us. Please allow 3 business days for your refill to be completed.          Additional Information About Your Visit        [email or web address] Information     [employer] is an electronic gateway that provides easy, online access to your medical records. With [employer], you can request a clinic appointment, read your test results, renew a prescription or communicate with your care team.     To sign up for [employer] visit the website at [email or web address]   You will be asked to enter the access code listed below, as well as some personal information. Please follow the directions to create your username and password.     Your access code is: [email or web address]  Expires: 2018  9:12 AM     Your access code will  in 90 days. If you need help or a new code, please contact your [facility] or call [phone] for assistance.        Care EveryWhere ID     This is your Care EveryWhere ID. This could be used by other organizations to access your [facility] medical records  [id number]        Your Vitals Were     Pulse Pulse Oximetry                64 96%           Blood Pressure from Last 3 Encounters:   18 146/82   18 128/79   18 131/86    Weight from Last 3 Encounters:   18 87.1 kg (192 lb)   18 86.6 kg (191 lb)   18 86.6 kg (191 lb)              We Performed the Following     PHYSICAL THERAPY REFERRAL        Primary Care Provider Office Phone # Fax #    Terence Thornton [phone] [address] [facility] [address]        Equal Access to Services     PROSPER MENESES : Hadii aad ku hadasho Soomaali, waaxda luqadaha, qaybta kaalmada adeegyada, naomi fernandez. So [facility] [phone].    ATENCIÓN: Si habla español, tiene a arora disposición servicios gratuitos de asistencia lingüística. Llame al [phone].    We comply with applicable federal civil rights laws and [place] laws. We do not discriminate on the basis of  race, color, national origin, age, disability, sex, sexual orientation, or gender identity.            Thank you!     Thank you for choosing [facility]  for your care. Our goal is always to provide you with excellent care. Hearing back from our patients is one way we can continue to improve our services. Please take a few minutes to complete the written survey that you may receive in the mail after your visit with us. Thank you!             Your Updated Medication List - Protect others around you: Learn how to safely use, store and throw away your medicines at www.disposemymeds.org.          This list is accurate as of 3/6/18 11:27 AM.  Always use your most recent med list.                   Brand Name Dispense Instructions for use Diagnosis    BENADRYL 25 MG tablet   Generic drug:  diphenhydrAMINE     56    1 TABLET EVERY 4 TO 6 HOURS AS NEEDED        CIALIS 10 MG tablet   Generic drug:  tadalafil      Take by mouth daily as needed for erectile dysfunction        diflorasone 0.05 % ointment    PSORCON    30 g    Apply to Leg eczema once daily as needed.    Eczema       hydrocortisone 2.5 % cream    ANUSOL-HC    30 g    Place rectally 2 times daily    External hemorrhoids       hydrOXYzine 25 MG tablet    ATARAX    60 tablet    Take 1-2 tablets (25-50 mg) by mouth every 6 hours as needed for itching    Rash       IBUPROFEN PO      Take 400 mg by mouth every 6 hours as needed for moderate pain        MICONAZOLE      applying to toes PRN        simvastatin 20 MG tablet    ZOCOR    90 tablet    Take 1 tablet (20 mg) by mouth At Bedtime    Hyperlipidemia LDL goal <130       TYLENOL PO      Take 500 mg by mouth

## 2018-03-06 NOTE — PROGRESS NOTES
Chief Complaint: Consult (trochanteric bursitis R hip/Terence Thornton)    Physician:  Terence Thornton    HPI: Karlos Wood is a 72 year old male who presents today for evaluation of his right hip.     Symptom Profile  Location of symptoms:  Lateral at the level of the greater troch   Onset: insidious  Duration of symptoms: multiple years   Quality of symptoms: aching   Severity: moderate   Alleviate:activity modification, sitting   Exacerbating: activities, walking   Previous Treatments: Previous treatments include activity modification, oral pain medication, GT injections that have improved his symptoms short term. Has not worked with a physical therapist.     Current Status:  Results of the patient s Hip Disability and Osteoarthritis Outcome Score (HOOS)  are as follows (0-100 scales with 100 being the theoretical best):  Pain:  Symptoms:  ADLs:  Sports/Recreation:  Quality of Life:  (http://koos.nu/)  UCLA Activity Score:    MEDICAL HISTORY:   Past Medical History:   Diagnosis Date     Elevated prostate specific antigen (PSA)     High Prostate     Other affections of shoulder region, not elsewhere classified     Right Shoulder Impingement     Other chronic sinusitis     Chronic Sinusitis     Pure hypercholesterolemia     High Chol     Varicose veins of legs, with delivery, with or without mention of antepartum condition     Varicose Vein       Pertinent negatives:  Patient has no history of DVT or PE. Discussed risk factors.    Medications:     Current Outpatient Prescriptions:      hydrocortisone (ANUSOL-HC) 2.5 % cream, Place rectally 2 times daily, Disp: 30 g, Rfl: 0     hydrOXYzine (ATARAX) 25 MG tablet, Take 1-2 tablets (25-50 mg) by mouth every 6 hours as needed for itching, Disp: 60 tablet, Rfl: 1     simvastatin (ZOCOR) 20 MG tablet, Take 1 tablet (20 mg) by mouth At Bedtime, Disp: 90 tablet, Rfl: 3     tadalafil (CIALIS) 10 MG tablet, Take by mouth daily as needed for erectile dysfunction, Disp: , Rfl:       Acetaminophen (TYLENOL PO), Take 500 mg by mouth, Disp: , Rfl:      IBUPROFEN PO, Take 400 mg by mouth every 6 hours as needed for moderate pain, Disp: , Rfl:      diflorasone (PSORCON) 0.05 % ointment, Apply to Leg eczema once daily as needed., Disp: 30 g, Rfl: 3     BENADRYL 25 MG OR TABS, 1 TABLET EVERY 4 TO 6 HOURS AS NEEDED, Disp: 56, Rfl: 0     MICONAZOLE, applying to toes PRN, Disp: , Rfl:     Allergies: Cephalosporins; Dicloxacillin; Garamycin [aminoglycosides]; Penicillin [esters]; and Zithromax [azithromycin]    SURGICAL HISTORY:   Past Surgical History:   Procedure Laterality Date     COLONOSCOPY  5/9/2007     FLEXIBLE SIGMOIDOSCOPY  3/27/2001    Flexible Sigmoidoscopy to 55 cm.     OSTEOTOMY FOOT  3/27/2014    Procedure: OSTEOTOMY FOOT;  Left Foot 1st Metatarsal Corrective Osteotomy, 3rd Toe Correction;  Surgeon: Delroy Teixeira DPM;  Location: [facility] OR     SURGICAL HISTORY OF -   12/12/2002    Arthroscopic subacromial decompression     SURGICAL HISTORY OF -   1982    Right Ankle Repair for bone chips     SURGICAL HISTORY OF -   3/2009    lipoma on back       FAMILY HISTORY:   Family History   Problem Relation Age of Onset     CANCER Mother      lung     CANCER Father      lung     Breast Cancer Sister      Asthma No family hx of      C.A.D. No family hx of      DIABETES No family hx of      Hypertension No family hx of      CEREBROVASCULAR DISEASE No family hx of      Cancer - colorectal No family hx of      Prostate Cancer No family hx of        SOCIAL HISTORY:   Social History   Substance Use Topics     Smoking status: Former Smoker     Smokeless tobacco: Never Used     Alcohol use Yes      Comment: 1-2 roberto every other week   retired  , three adult children, lives with spouse     REVIEW OF SYSTEMS:  The comprehensive review of systems from the intake form was reviewed with the patient.  No fever, weight change or fatigue. No dry eyes. No oral ulcers, sore throat or voice change. No  palpitations, syncope, angina or edema.  No chest pain, excessive sleepiness, shortness of breath or hemoptysis.   No abdominal pain, nausea, vomiting, diarrhea or heartburn.  No skin rash. No focal weakness or numbness. No bleeding or lymphadenopathy. No rhinitis or hives.     Exam:  On physical examination the patient appears the stated age, is in no acute distress, affectThe is appropriate, and breathing is non-labored.  Vitals are documented in the EMR and have been reviewed:    /82 (BP Location: Left arm)  Pulse 64  SpO2 96%  Data Unavailable  There is no height or weight on file to calculate BMI.    Rises from chair: easily   Gait: normal   Hip ROM and fluid and painless. Symptoms are reproduced with palpation at the level of the greater trochanter.     X-rays:   Tonnis grade 1     Assessment and Plan: This is a 72 year old with greater trochanter pain syndrome. We discussed the diagnosis and the treatment options, that the options are limited, and that I do not recommend operative management for the problem. I counseled that one or two injections is common but past this I have concerns about their affects on the abductors and would not recommend additional injections in his case. We discussed an exercise program and a referral to a physical therapist was written. He will follow-up as needed with non-operative orthopaedics

## 2018-03-07 ENCOUNTER — ANESTHESIA (OUTPATIENT)
Dept: SURGERY | Facility: CLINIC | Age: 73
End: 2018-03-07
Payer: MEDICARE

## 2018-03-07 ENCOUNTER — HOSPITAL ENCOUNTER (OUTPATIENT)
Facility: CLINIC | Age: 73
Discharge: HOME | End: 2018-03-07
Attending: SURGERY | Admitting: SURGERY
Payer: MEDICARE

## 2018-03-07 ENCOUNTER — NURSE TRIAGE (OUTPATIENT)
Dept: NURSING | Facility: CLINIC | Age: 73
End: 2018-03-07

## 2018-03-07 VITALS
HEIGHT: 68 IN | TEMPERATURE: 97.9 F | RESPIRATION RATE: 20 BRPM | WEIGHT: 192 LBS | BODY MASS INDEX: 29.1 KG/M2 | DIASTOLIC BLOOD PRESSURE: 74 MMHG | HEART RATE: 88 BPM | OXYGEN SATURATION: 95 % | SYSTOLIC BLOOD PRESSURE: 135 MMHG

## 2018-03-07 DIAGNOSIS — G89.18 POST-OP PAIN: Primary | ICD-10-CM

## 2018-03-07 PROCEDURE — 25000132 ZZH RX MED GY IP 250 OP 250 PS 637: Mod: GY | Performed by: SURGERY

## 2018-03-07 PROCEDURE — 25000125 ZZHC RX 250: Performed by: NURSE ANESTHETIST, CERTIFIED REGISTERED

## 2018-03-07 PROCEDURE — A9270 NON-COVERED ITEM OR SERVICE: HCPCS | Mod: GY | Performed by: SURGERY

## 2018-03-07 PROCEDURE — 71000012 ZZH RECOVERY PHASE 1 LEVEL 1 FIRST HR: Performed by: SURGERY

## 2018-03-07 PROCEDURE — 40000306 ZZH STATISTIC PRE PROC ASSESS II: Performed by: SURGERY

## 2018-03-07 PROCEDURE — 25000125 ZZHC RX 250: Performed by: SURGERY

## 2018-03-07 PROCEDURE — 37000009 ZZH ANESTHESIA TECHNICAL FEE, EACH ADDTL 15 MIN: Performed by: SURGERY

## 2018-03-07 PROCEDURE — C9290 INJ, BUPIVACAINE LIPOSOME: HCPCS | Performed by: SURGERY

## 2018-03-07 PROCEDURE — 25000128 H RX IP 250 OP 636: Performed by: NURSE ANESTHETIST, CERTIFIED REGISTERED

## 2018-03-07 PROCEDURE — 25000128 H RX IP 250 OP 636: Performed by: SURGERY

## 2018-03-07 PROCEDURE — 27210794 ZZH OR GENERAL SUPPLY STERILE: Performed by: SURGERY

## 2018-03-07 PROCEDURE — 46260 REMOVE IN/EX HEM GROUPS 2+: CPT | Mod: 52 | Performed by: SURGERY

## 2018-03-07 PROCEDURE — 71000027 ZZH RECOVERY PHASE 2 EACH 15 MINS: Performed by: SURGERY

## 2018-03-07 PROCEDURE — 25000564 ZZH DESFLURANE, EA 15 MIN: Performed by: SURGERY

## 2018-03-07 PROCEDURE — 36000050 ZZH SURGERY LEVEL 2 1ST 30 MIN: Performed by: SURGERY

## 2018-03-07 PROCEDURE — 71000013 ZZH RECOVERY PHASE 1 LEVEL 1 EA ADDTL HR: Performed by: SURGERY

## 2018-03-07 PROCEDURE — 37000008 ZZH ANESTHESIA TECHNICAL FEE, 1ST 30 MIN: Performed by: SURGERY

## 2018-03-07 PROCEDURE — 88304 TISSUE EXAM BY PATHOLOGIST: CPT | Mod: 26 | Performed by: SURGERY

## 2018-03-07 PROCEDURE — 88304 TISSUE EXAM BY PATHOLOGIST: CPT | Performed by: SURGERY

## 2018-03-07 PROCEDURE — 36000052 ZZH SURGERY LEVEL 2 EA 15 ADDTL MIN: Performed by: SURGERY

## 2018-03-07 RX ORDER — NEOSTIGMINE METHYLSULFATE 1 MG/ML
VIAL (ML) INJECTION PRN
Status: DISCONTINUED | OUTPATIENT
Start: 2018-03-07 | End: 2018-03-07

## 2018-03-07 RX ORDER — LIDOCAINE 40 MG/G
CREAM TOPICAL
Status: DISCONTINUED | OUTPATIENT
Start: 2018-03-07 | End: 2018-03-07 | Stop reason: HOSPADM

## 2018-03-07 RX ORDER — HYDROCODONE BITARTRATE AND ACETAMINOPHEN 5; 325 MG/1; MG/1
1 TABLET ORAL
Status: COMPLETED | OUTPATIENT
Start: 2018-03-07 | End: 2018-03-07

## 2018-03-07 RX ORDER — PROPOFOL 10 MG/ML
INJECTION, EMULSION INTRAVENOUS PRN
Status: DISCONTINUED | OUTPATIENT
Start: 2018-03-07 | End: 2018-03-07

## 2018-03-07 RX ORDER — SODIUM CHLORIDE, SODIUM LACTATE, POTASSIUM CHLORIDE, CALCIUM CHLORIDE 600; 310; 30; 20 MG/100ML; MG/100ML; MG/100ML; MG/100ML
INJECTION, SOLUTION INTRAVENOUS CONTINUOUS
Status: DISCONTINUED | OUTPATIENT
Start: 2018-03-07 | End: 2018-03-07 | Stop reason: HOSPADM

## 2018-03-07 RX ORDER — EPHEDRINE SULFATE 50 MG/ML
INJECTION, SOLUTION INTRAVENOUS PRN
Status: DISCONTINUED | OUTPATIENT
Start: 2018-03-07 | End: 2018-03-07

## 2018-03-07 RX ORDER — MEPERIDINE HYDROCHLORIDE 25 MG/ML
12.5 INJECTION INTRAMUSCULAR; INTRAVENOUS; SUBCUTANEOUS
Status: DISCONTINUED | OUTPATIENT
Start: 2018-03-07 | End: 2018-03-07 | Stop reason: HOSPADM

## 2018-03-07 RX ORDER — GLYCOPYRROLATE 0.2 MG/ML
INJECTION, SOLUTION INTRAMUSCULAR; INTRAVENOUS PRN
Status: DISCONTINUED | OUTPATIENT
Start: 2018-03-07 | End: 2018-03-07

## 2018-03-07 RX ORDER — ONDANSETRON 2 MG/ML
INJECTION INTRAMUSCULAR; INTRAVENOUS PRN
Status: DISCONTINUED | OUTPATIENT
Start: 2018-03-07 | End: 2018-03-07

## 2018-03-07 RX ORDER — NALOXONE HYDROCHLORIDE 0.4 MG/ML
.1-.4 INJECTION, SOLUTION INTRAMUSCULAR; INTRAVENOUS; SUBCUTANEOUS
Status: DISCONTINUED | OUTPATIENT
Start: 2018-03-07 | End: 2018-03-07 | Stop reason: HOSPADM

## 2018-03-07 RX ORDER — FENTANYL CITRATE 50 UG/ML
INJECTION, SOLUTION INTRAMUSCULAR; INTRAVENOUS PRN
Status: DISCONTINUED | OUTPATIENT
Start: 2018-03-07 | End: 2018-03-07

## 2018-03-07 RX ORDER — HYDROMORPHONE HYDROCHLORIDE 1 MG/ML
.3-.5 INJECTION, SOLUTION INTRAMUSCULAR; INTRAVENOUS; SUBCUTANEOUS EVERY 10 MIN PRN
Status: DISCONTINUED | OUTPATIENT
Start: 2018-03-07 | End: 2018-03-07 | Stop reason: HOSPADM

## 2018-03-07 RX ORDER — FENTANYL CITRATE 50 UG/ML
25-50 INJECTION, SOLUTION INTRAMUSCULAR; INTRAVENOUS
Status: DISCONTINUED | OUTPATIENT
Start: 2018-03-07 | End: 2018-03-07 | Stop reason: HOSPADM

## 2018-03-07 RX ORDER — DEXAMETHASONE SODIUM PHOSPHATE 4 MG/ML
INJECTION, SOLUTION INTRA-ARTICULAR; INTRALESIONAL; INTRAMUSCULAR; INTRAVENOUS; SOFT TISSUE PRN
Status: DISCONTINUED | OUTPATIENT
Start: 2018-03-07 | End: 2018-03-07

## 2018-03-07 RX ORDER — ONDANSETRON 2 MG/ML
4 INJECTION INTRAMUSCULAR; INTRAVENOUS EVERY 30 MIN PRN
Status: DISCONTINUED | OUTPATIENT
Start: 2018-03-07 | End: 2018-03-07 | Stop reason: HOSPADM

## 2018-03-07 RX ORDER — ONDANSETRON 4 MG/1
4 TABLET, ORALLY DISINTEGRATING ORAL EVERY 30 MIN PRN
Status: DISCONTINUED | OUTPATIENT
Start: 2018-03-07 | End: 2018-03-07 | Stop reason: HOSPADM

## 2018-03-07 RX ORDER — CLINDAMYCIN PHOSPHATE 900 MG/50ML
900 INJECTION, SOLUTION INTRAVENOUS
Status: COMPLETED | OUTPATIENT
Start: 2018-03-07 | End: 2018-03-07

## 2018-03-07 RX ORDER — HYDROCODONE BITARTRATE AND ACETAMINOPHEN 5; 325 MG/1; MG/1
1-2 TABLET ORAL EVERY 4 HOURS PRN
Qty: 30 TABLET | Refills: 0 | Status: SHIPPED | OUTPATIENT
Start: 2018-03-07 | End: 2018-06-06

## 2018-03-07 RX ORDER — CLINDAMYCIN PHOSPHATE 900 MG/50ML
900 INJECTION, SOLUTION INTRAVENOUS SEE ADMIN INSTRUCTIONS
Status: DISCONTINUED | OUTPATIENT
Start: 2018-03-07 | End: 2018-03-07 | Stop reason: HOSPADM

## 2018-03-07 RX ADMIN — SODIUM CHLORIDE, POTASSIUM CHLORIDE, SODIUM LACTATE AND CALCIUM CHLORIDE: 600; 310; 30; 20 INJECTION, SOLUTION INTRAVENOUS at 12:50

## 2018-03-07 RX ADMIN — MIDAZOLAM 1 MG: 1 INJECTION INTRAMUSCULAR; INTRAVENOUS at 13:15

## 2018-03-07 RX ADMIN — CLINDAMYCIN PHOSPHATE 900 MG: 18 INJECTION, SOLUTION INTRAVENOUS at 13:14

## 2018-03-07 RX ADMIN — HYDROCODONE BITARTRATE AND ACETAMINOPHEN 1 TABLET: 5; 325 TABLET ORAL at 14:54

## 2018-03-07 RX ADMIN — FENTANYL CITRATE 50 MCG: 50 INJECTION, SOLUTION INTRAMUSCULAR; INTRAVENOUS at 13:15

## 2018-03-07 RX ADMIN — MIDAZOLAM 1 MG: 1 INJECTION INTRAMUSCULAR; INTRAVENOUS at 13:17

## 2018-03-07 RX ADMIN — FENTANYL CITRATE 50 MCG: 50 INJECTION, SOLUTION INTRAMUSCULAR; INTRAVENOUS at 13:47

## 2018-03-07 RX ADMIN — EPHEDRINE SULFATE 10 MG: 50 INJECTION, SOLUTION INTRAVENOUS at 13:34

## 2018-03-07 RX ADMIN — PROPOFOL 160 MG: 10 INJECTION, EMULSION INTRAVENOUS at 13:20

## 2018-03-07 RX ADMIN — ROCURONIUM BROMIDE 35 MG: 10 INJECTION INTRAVENOUS at 13:20

## 2018-03-07 RX ADMIN — Medication 4.5 MG: at 13:51

## 2018-03-07 RX ADMIN — GLYCOPYRROLATE 0.9 MG: 0.2 INJECTION, SOLUTION INTRAMUSCULAR; INTRAVENOUS at 13:51

## 2018-03-07 RX ADMIN — FENTANYL CITRATE 50 MCG: 50 INJECTION, SOLUTION INTRAMUSCULAR; INTRAVENOUS at 13:28

## 2018-03-07 RX ADMIN — GLYCOPYRROLATE 0.2 MG: 0.2 INJECTION, SOLUTION INTRAMUSCULAR; INTRAVENOUS at 13:17

## 2018-03-07 RX ADMIN — LIDOCAINE HYDROCHLORIDE 1 ML: 10 INJECTION, SOLUTION EPIDURAL; INFILTRATION; INTRACAUDAL; PERINEURAL at 12:50

## 2018-03-07 RX ADMIN — ONDANSETRON 4 MG: 2 INJECTION INTRAMUSCULAR; INTRAVENOUS at 13:17

## 2018-03-07 RX ADMIN — FENTANYL CITRATE 50 MCG: 50 INJECTION, SOLUTION INTRAMUSCULAR; INTRAVENOUS at 13:38

## 2018-03-07 RX ADMIN — FENTANYL CITRATE 50 MCG: 50 INJECTION, SOLUTION INTRAMUSCULAR; INTRAVENOUS at 13:18

## 2018-03-07 RX ADMIN — DEXAMETHASONE SODIUM PHOSPHATE 4 MG: 4 INJECTION, SOLUTION INTRA-ARTICULAR; INTRALESIONAL; INTRAMUSCULAR; INTRAVENOUS; SOFT TISSUE at 13:17

## 2018-03-07 NOTE — ANESTHESIA POSTPROCEDURE EVALUATION
Patient: Karlos Wood    Procedure(s):  Examination Under Anesthesia & Internal  Hemorrhoidectomy - Wound Class: II-Clean Contaminated    Diagnosis:bleeding internal hemorrhoids  Diagnosis Additional Information: No value filed.    Anesthesia Type:  General    Note:  Anesthesia Post Evaluation    Patient location during evaluation: PACU and Bedside  Patient participation: Able to fully participate in evaluation  Level of consciousness: awake  Pain management: adequate  Airway patency: patent  Cardiovascular status: acceptable  Respiratory status: acceptable  Hydration status: acceptable  PONV: none     Anesthetic complications: None          Last vitals:  Vitals:    03/07/18 1216 03/07/18 1419   BP: 126/81 134/84   Pulse: 54 85   Resp: 16 16   Temp: 36.8  C (98.3  F) 36.4  C (97.5  F)   SpO2: 97% 96%         Electronically Signed By: Cristino Askew CRNA, APRN CRNA  March 7, 2018  2:54 PM

## 2018-03-07 NOTE — OP NOTE
Procedure Date: 2018      PREOPERATIVE DIAGNOSIS:  Bleeding internal hemorrhoids.      POSTOPERATIVE DIAGNOSIS:  Bleeding internal hemorrhoids.      PROCEDURE:  Examination under anesthesia, internal hemorrhoidectomy.      ANESTHESIA:  General.      SURGEON:  Justo Vazquez MD      INDICATIONS FOR SURGERY:  This is a 72-year-old gentleman with painless bleeding with bowel movements.  I discussed examination under anesthesia and hemorrhoidectomy with him.  Risks and benefits were explained.  He seemed to understand and was agreeable to proceed.      DESCRIPTION OF PROCEDURE:  Under general anesthesia and in prone position, the patient's perianal area was prepped and draped in the usual manner, 20 mL of Exparel was used to infiltrate circumferentially around the anal canal.  The speculum was placed.  He had medium-sized internal hemorrhoid over in the left lateral position and a smaller sized internal hemorrhoid in the right lateral position.  Anteriorly and posteriorly, he did not have any obvious hemorrhoids.  The 2 lateral hemorrhoids were then removed sharply with a 15 blade knife.  Hemostasis was achieved with electrocautery and the mucosa was reapproximated using a running 3-0 Vicryl stitch in a locking manner on both sites.  The specimen was handed off for pathology examination.  The suture line was hemostatic.  Dressings were then placed.  The patient tolerated the procedure well.  He went to PACU in stable condition.  Estimated blood loss was 10 mL.         JUSTO VAZQUEZ MD             D: 2018   T: 2018   MT: AMRITA      Name:     AMAURY ASH   MRN:      [id number]        Account:        [id number]   :      1945           Procedure Date: 2018      Document: [id number]

## 2018-03-07 NOTE — ANESTHESIA PREPROCEDURE EVALUATION
Anesthesia Evaluation     . Pt has had prior anesthetic. Type: General and MAC           ROS/MED HX    ENT/Pulmonary: Comment: Chronic sinusitis    (+)tobacco use, Past use , . .    Neurologic:  - neg neurologic ROS     Cardiovascular: Comment: EKG: Normal Sinus Rhythm, normal axis, normal intervals, no acute ST/T changes c/w ischemia, no LVH by voltage criteria, Right Bundle Branch Block, unchanged from previous tracings (per H and P)    Varicose veins    (+) Dyslipidemia, ----. : . . . :. . Previous cardiac testing Echodate:3-6-18results:Interpretation Summary      1. The left ventricle is normal in structure, function and size. The visual  ejection fraction is estimated at 65-70%.Normal left ventricular wall motion  2. There is mild concentric left ventricular hypertrophy.  3. The right ventricle is normal in structure, function and size.  4. No significant valve disease.      No previous echo for comparison.date: results:ECG reviewed date:3-1-18 results:Sinus Rhythm   -Right bundle branch block.     ABNORMAL date: results:          METS/Exercise Tolerance:  >4 METS   Hematologic:  - neg hematologic  ROS       Musculoskeletal: Comment: Trochanteric bursitis        GI/Hepatic: Comment: Hemorrhoids  Colon polyps       (-) liver disease   Renal/Genitourinary: Comment: Elevated PSA        Endo:  - neg endo ROS       Psychiatric:  - neg psychiatric ROS       Infectious Disease:  - neg infectious disease ROS       Malignancy:      - no malignancy   Other:    - neg other ROS                 Physical Exam  Normal systems: cardiovascular, pulmonary and dental    Airway   Mallampati: II    Dental     Cardiovascular       Pulmonary                     Anesthesia Plan      History & Physical Review  History and physical reviewed and following examination; no interval change.    ASA Status:  2 .    NPO Status:  > 6 hours    Plan for General with Intravenous and Propofol induction. Maintenance will be Inhalation.    PONV  prophylaxis:  Ondansetron (or other 5HT-3) and Dexamethasone or Solumedrol  Additional equipment: Videolaryngoscope      Postoperative Care  Postoperative pain management:  IV analgesics.      Consents  Anesthetic plan, risks, benefits and alternatives discussed with:  Patient..                          .

## 2018-03-07 NOTE — ANESTHESIA CARE TRANSFER NOTE
Patient: Karlos Wood    Procedure(s):  Examination Under Anesthesia & Internal  Hemorrhoidectomy - Wound Class: II-Clean Contaminated    Diagnosis: bleeding internal hemorrhoids  Diagnosis Additional Information: No value filed.    Anesthesia Type:   General     Note:  Airway :Nasal Cannula  Patient transferred to:PACU  Handoff Report: Identifed the Patient, Identified the Reponsible Provider, Reviewed the pertinent medical history, Discussed the surgical course, Reviewed Intra-OP anesthesia mangement and issues during anesthesia, Set expectations for post-procedure period and Allowed opportunity for questions and acknowledgement of understanding      Vitals: (Last set prior to Anesthesia Care Transfer)    CRNA VITALS  3/7/2018 1346 - 3/7/2018 1420      3/7/2018             Pulse: 86    SpO2: 93 %                Electronically Signed By: Cristino Askew CRNA, APRN CRNA  March 7, 2018  2:20 PM

## 2018-03-07 NOTE — IP AVS SNAPSHOT
[facility]    [address] [facility] [address]    Phone:  [phone]                                       After Visit Summary   3/7/2018    Karlos Wood    MRN: [id number]           After Visit Summary Signature Page     I have received my discharge instructions, and my questions have been answered. I have discussed any challenges I see with this plan with the nurse or doctor.    ..........................................................................................................................................  Patient/Patient Representative Signature      ..........................................................................................................................................  Patient Representative Print Name and Relationship to Patient    ..................................................               ................................................  Date                                            Time    ..........................................................................................................................................  Reviewed by Signature/Title    ...................................................              ..............................................  Date                                                            Time

## 2018-03-07 NOTE — H&P (VIEW-ONLY)
[facility]  [address]  [facility] [address]  [phone]  Dept: [phone]    PRE-OP EVALUATION:  Today's date: 3/5/2018    Karlos Wood (: 1945) presents for pre-operative evaluation assessment as requested by Justo Luo MD .  He requires evaluation and anesthesia risk assessment prior to undergoing surgery/procedure for treatment of .HEMORRHOIDs INTERNAL    Proposed Surgery/ Procedure: HEMORRHOIDECTOMY  Date of Surgery/ Procedure: 3/7/18  Time of Surgery/ Procedure: 2:15  Hospital/Surgical Facility: [facility]   Fax number for surgical facility:   Primary Physician: Terence Thornton  Type of Anesthesia Anticipated: Monitor Anesthesia Care    Patient has a Health Care Directive or Living Will:  NO    1. NO - Do you have a history of heart attack, stroke, stent, bypass or surgery on an artery in the head, neck, heart or legs?  2. NO - Do you ever have any pain or discomfort in your chest?  3. NO - Do you have a history of  Heart Failure?  4. NO - Are you troubled by shortness of breath when: walking on the level, up a slight hill or at night?  5. NO - Do you currently have a cold, bronchitis or other respiratory infection?  6. NO - Do you have a cough, shortness of breath or wheezing?  7. NO - Do you sometimes get pains in the calves of your legs when you walk?  8. NO - Do you or anyone in your family have previous history of blood clots?  9. NO - Do you or does anyone in your family have a serious bleeding problem such as prolonged bleeding following surgeries or cuts?  10. NO - Have you ever had problems with anemia or been told to take iron pills?  11. NO - Have you had any abnormal blood loss such as black, tarry or bloody stools, or abnormal vaginal bleeding?  12. NO - Have you ever had a blood transfusion?  13. NO - Have you or any of your relatives ever had problems with anesthesia?  14. NO - Do you have sleep apnea, excessive snoring or  daytime drowsiness?  15. NO - Do you have any prosthetic heart valves?  16. NO - Do you have prosthetic joints?  17. NO - Is there any chance that you may be pregnant?      HPI:     HPI related to upcoming procedure:       He is scheduled for hemorrhoid surgery. The surgeon is requesting consultation regarding anesthesia and surgical risk for this patient with respect to current and past medical conditions.    MEDICAL HISTORY:     Patient Active Problem List    Diagnosis Date Noted     Colon polyps 09/26/2014     Priority: Medium     Benign tubular adenomatous polyps - repeat colonoscopy in 5 years       Bunion 07/17/2013     Priority: Medium     Health Care Home 12/14/2012     Priority: Medium     Marcie Carey RN-PHN  FPA / [facility]   [phone]    DX V65.8 REPLACED WITH [id number] HEALTH CARE HOME (04/08/2013)       Advanced directives, counseling/discussion 09/25/2012     Priority: Medium     Patient does not have an Advance/Health Care Directive (HCD), requests blank HCD form.    Little Gutierrez  September 25, 2012         Pain in shoulder 04/19/2011     Priority: Medium     Varicose veins 04/19/2011     Priority: Medium     Trochanteric bursitis 04/19/2011     Priority: Medium     HYPERLIPIDEMIA LDL GOAL <130 10/31/2010     Priority: Medium     Lipoma of skin and subcutaneous tissue 03/20/2009     Priority: Medium      Past Medical History:   Diagnosis Date     Elevated prostate specific antigen (PSA)     High Prostate     Other affections of shoulder region, not elsewhere classified     Right Shoulder Impingement     Other chronic sinusitis     Chronic Sinusitis     Pure hypercholesterolemia     High Chol     Varicose veins of legs, with delivery, with or without mention of antepartum condition     Varicose Vein     Past Surgical History:   Procedure Laterality Date     COLONOSCOPY  5/9/2007     FLEXIBLE SIGMOIDOSCOPY  3/27/2001    Flexible Sigmoidoscopy to 55 cm.     OSTEOTOMY FOOT   "3/27/2014    Procedure: OSTEOTOMY FOOT;  Left Foot 1st Metatarsal Corrective Osteotomy, 3rd Toe Correction;  Surgeon: Delroy Teixeira DPM;  Location: [facility] OR     SURGICAL HISTORY OF -   12/12/2002    Arthroscopic subacromial decompression     SURGICAL HISTORY OF -   1982    Right Ankle Repair for bone chips     SURGICAL HISTORY OF -   3/2009    lipoma on back     Current Outpatient Prescriptions   Medication Sig Dispense Refill     hydrocortisone (ANUSOL-HC) 2.5 % cream Place rectally 2 times daily 30 g 0     hydrOXYzine (ATARAX) 25 MG tablet Take 1-2 tablets (25-50 mg) by mouth every 6 hours as needed for itching 60 tablet 1     simvastatin (ZOCOR) 20 MG tablet Take 1 tablet (20 mg) by mouth At Bedtime 90 tablet 3     tadalafil (CIALIS) 10 MG tablet Take by mouth daily as needed for erectile dysfunction       Acetaminophen (TYLENOL PO) Take 500 mg by mouth       diflorasone (PSORCON) 0.05 % ointment Apply to Leg eczema once daily as needed. 30 g 3     BENADRYL 25 MG OR TABS 1 TABLET EVERY 4 TO 6 HOURS AS NEEDED 56 0     MICONAZOLE applying to toes PRN       IBUPROFEN PO Take 400 mg by mouth every 6 hours as needed for moderate pain       OTC products: None, except as noted above    Allergies   Allergen Reactions     Cephalosporins      Dicloxacillin      Garamycin [Aminoglycosides]      6/95 PCN-Hosp overnight     Penicillin [Esters]      Zithromax [Azithromycin]      Itchy       Latex Allergy: NO    Social History   Substance Use Topics     Smoking status: Former Smoker     Smokeless tobacco: Never Used     Alcohol use Yes      Comment: 1-2 roberto every other week     History   Drug Use No       REVIEW OF SYSTEMS:   Constitutional, neuro, ENT, endocrine, pulmonary, cardiac, gastrointestinal, genitourinary, musculoskeletal, integument and psychiatric systems are negative, except as otherwise noted.    EXAM:   /87  Pulse 68  Temp 98.3  F (36.8  C)  Resp 18  Ht 5' 8.25\" (1.734 m)  Wt 192 lb (87.1 kg)  " SpO2 98%  BMI 28.98 kg/m2    GENERAL APPEARANCE: healthy, alert and no distress     EYES: EOMI,  PERRL     HENT: ear canals and TM's normal and nose and mouth without ulcers or lesions     NECK: no adenopathy, no asymmetry, masses, or scars and thyroid normal to palpation     RESP: lungs clear to auscultation - no rales, rhonchi or wheezes     CV: regular rates and rhythm, normal S1 S2, no S3 or S4 and no murmur, click or rub     ABDOMEN:  soft, nontender, no HSM or masses and bowel sounds normal     MS: extremities normal- no gross deformities noted, no evidence of inflammation in joints, FROM in all extremities.     SKIN: no suspicious lesions or rashes     NEURO: Normal strength and tone, sensory exam grossly normal, mentation intact and speech normal     PSYCH: mentation appears normal. and affect normal/bright     LYMPHATICS: No cervical adenopathy    DIAGNOSTICS:   EKG: Normal Sinus Rhythm, normal axis, normal intervals, no acute ST/T changes c/w ischemia, no LVH by voltage criteria, Right Bundle Branch Block, unchanged from previous tracings    Recent Labs   Lab Test  04/18/16   0840  04/27/15   0746   03/30/11   0835  04/28/10   0744   HGB   --    --    --   15.3  14.4   PLT   --    --    --   171  157   NA  142  140   < >  142  144   POTASSIUM  4.0  4.1   < >  4.6  4.3   CR  0.96  1.01   < >  1.15  1.07    < > = values in this interval not displayed.        IMPRESSION:   Reason for surgery/procedure: hemorrhoids  Diagnosis/reason for consult:    Preop general physical exam  Hemorrhoids, unspecified hemorrhoid type  RBBB (right bundle branch block)  Lipoma of skin and subcutaneous tissue  Hyperlipidemia LDL goal <130  Shoulder pain, unspecified chronicity, unspecified laterality  Trochanteric bursitis of right hip  Advanced directives, counseling/discussion  Health Care Home  Bunion  Polyp of colon, unspecified part of colon, unspecified type        The proposed surgical procedure is considered LOW  risk.    REVISED CARDIAC RISK INDEX  The patient has the following serious cardiovascular risks for perioperative complications such as (MI, PE, VFib and 3  AV Block):  No serious cardiac risks, right bundle branch block noted in an asymptomatic patient with no significant cardiac risk, echo pending  INTERPRETATION: 1 risks: Class II (low risk - 0.9% complication rate)    The patient has the following additional risks for perioperative complications:        ICD-10-CM    1. Preop general physical exam Z01.818        RECOMMENDATIONS:         --Patient is to take all scheduled medications on the day of surgery EXCEPT for modifications listed below.    Approval for surgery pending echocardiogram to rule out structural heart disease.  This is scheduled prior to surgery.       Signed Electronically by: Terence Thornton MD    Copy of this evaluation report is provided to requesting physician.    [facility] Preop Guidelines

## 2018-03-07 NOTE — DISCHARGE INSTRUCTIONS
Same Day Surgery Discharge Instructions  Special Precautions After Surgery - Adult    1. It is not unusual to feel lightheaded or faint, up to 24 hours after surgery or while taking pain medication.  If you have these symptoms; sit for a few minutes before standing and have someone assist you when getting up.  2. You should rest and relax for the next 24 hours and must have someone stay with you for at least 24 hours after your discharge.  3. DO NOT DRIVE any vehicle or operate mechanical equipment for 24 hours following the end of your surgery.  DO NOT DRIVE while taking narcotic pain medications that have been prescribed by your physician.  If you had a limb operated on, you must be able to use it fully to drive.  4. DO NOT drink alcoholic beverages for 24 hours following surgery or while taking prescription pain medication.  5. Drink clear liquids (apple juice, ginger ale, broth, 7-Up, etc.).  Progress to your regular diet as you feel able.  6. Any questions call your physician and do not make important decisions for 24 hours.    ACTIVITY  ? Per surgeon     INCISIONAL CARE  ? Change fluffs as needed  ? Keep stools soft with Milk of Magnesia     Call for an appointment to return to the clinic 1-2 weeks with Dr. [place]    Medications:  ? Hydrocodone (Norco, Vicodin):  Next dose: 6PM.  ? Follow the instructions on the bottle.       __________________________________________________________________________________________________________________________________  IMPORTANT NUMBERS:    [facility] Main Number:  [phone], [phone]  Pharmacy:  [phone]  Same Day Surgery:  [phone], Monday - Friday until 8:30 p.m.  Urgent Care:  [phone]  Emergency Room:  [phone]      [facility]:  [phone]                                                                             Sur[facility] Specialty Clinic:  [phone]

## 2018-03-08 NOTE — TELEPHONE ENCOUNTER
Patient called reporting that he was discharged today from having a hemorrhoidectomy and is reporting that he has the urge to urinate but is unable to. Patient denies fever. This writer reported that it was necessary to page the on-call surgeon for Dr. Otto and see what she advises. Patient agreed. This writer attempted to page on-call surgeon via page  at 11:05pm and they informed this writer that the [place] [facility] needs to be called in order to page the on-call surgeon. This writer then called the [place] [facility] at 11:07pm and spoke with an RN who reported that Dr. Bueno is on-call for Dr. Otto and that she could page him to this writer but not the patient. This writer provided RN with this writers name and call back number. The on-call surgeon, Dr. Bueno, was paged at 11:08pm. Received call back from Dr. Bueno at 11:10pm and this writer provider the patient's information and concern about being unable to urinate. Dr. Bueno advised that patient needs to be seen in the ER. Dr. Bueno also reported that he will update  with patient's inability to urinate post-op and that patient will be going in to ER. This writer called patient back at 11:11am to relay message that Dr. Bueno advised patient to go to ER. Patient agreed with plan and reported he will go in to the ER right now.     Reason for Disposition    Caller has URGENT question and triager unable to answer question    Additional Information    Negative: Sounds like a life-threatening emergency to the triager    Negative: Chest pain    Negative: Difficulty breathing    Negative: Surgical incision symptoms and questions    Negative: [1] Discomfort (pain, burning or stinging) when passing urine AND [2] male    Negative: [1] Discomfort (pain, burning or stinging) when passing urine AND [2] female    Negative: Constipation    Negative: Calf pain    Negative: Dizziness is severe, or persists > 24 hours after surgery    Negative: Pain, redness, swelling, or pus  at IV Site    Negative: Symptoms arising from use of a urinary catheter (Marrufo or Coude)    Negative: Cast problems or questions    Negative: Medication question    Negative: [1] Widespread rash AND [2] bright red, sunburn-like    Negative: [1] SEVERE headache AND [2] after spinal (epidural) anesthesia    Negative: [1] Vomiting AND [2] persists > 4 hours    Negative: [1] Vomiting AND [2] abdomen looks much more swollen than usual    Negative: [1] Drinking very little AND [2] dehydration suspected (e.g., no urine > 12 hours, very dry mouth, very lightheaded)    Negative: Patient sounds very sick or weak to the triager    Negative: Sounds like a serious complication to the triager    Negative: Fever > 100.5 F (38.1 C)    Negative: [1] SEVERE post-op pain (e.g., excruciating, pain scale 8-10) AND [2] not controlled with pain medications    Protocols used: POST-OP SYMPTOMS AND QUESTIONS-ADULT-    Little Peck RN/[facility]

## 2018-03-09 ENCOUNTER — TELEPHONE (OUTPATIENT)
Dept: SURGERY | Facility: CLINIC | Age: 73
End: 2018-03-09

## 2018-03-09 ENCOUNTER — TRANSFERRED RECORDS (OUTPATIENT)
Dept: HEALTH INFORMATION MANAGEMENT | Facility: CLINIC | Age: 73
End: 2018-03-09

## 2018-03-09 NOTE — TELEPHONE ENCOUNTER
I spoke to Karlos earlier today. He had hemorrhoidectomy with Dr Otto on 3-7-18.  He said that he has been having some problem with urinary leaking. I asked him if he thinks he is emptying his bladder. He says that he has been going to the bathroom today about every half an hour. He thinks that he leaks more than he voids. I offered to have him come in to clinic and I would do a bladder scan on him and if needed catheterize him or teach him to self cath. Dr Bueno is our surgeon in clinic/ on call so I have advised him. Karlos says that he has no abdominal/ pelvic pain and he lives in [place] now and just doesn't feel that he needs to come to clinic at this time. I gave him my phone # and asked him to please call me if he is having any abdominal discomfort. I also reminded him that if he should have problems with abdominal pain and is not urinating at all then he will need to be seen in UC or ER over the weekend or evening. Kacie SQUIRES RN

## 2018-03-09 NOTE — TELEPHONE ENCOUNTER
Reason for Call:  Other call back    Detailed comments: pt calling stating he had surgery on 3/7, since then he has been having bladder control issues. Would like to know what recommendations there are    Phone Number Patient can be reached at: Home number on file [phone] (home)    Best Time: any     Can we leave a detailed message on this number? YES    Call taken on 3/9/2018 at 11:38 AM by Ema Chester

## 2018-03-12 ENCOUNTER — TELEPHONE (OUTPATIENT)
Dept: UROLOGY | Facility: CLINIC | Age: 73
End: 2018-03-12

## 2018-03-12 LAB — COPATH REPORT: NORMAL

## 2018-03-12 NOTE — TELEPHONE ENCOUNTER
Reason for Call:  Other cath removal    Detailed comments: Pt states he was told to see urology Monday to have cath removed, no openings, please call to advise    Phone Number Patient can be reached at: Home number on file [phone] (home)    Best Time: today    Can we leave a detailed message on this number? YES    Call taken on 3/12/2018 at 1:29 PM by Melisa Vázquez

## 2018-03-12 NOTE — TELEPHONE ENCOUNTER
Pt called [facility] and made appt to be seen in clinic to have catheter removed 3/19/18.  Sally EAGLE RN BSN PHN  Specialty Clinics

## 2018-03-19 ENCOUNTER — OFFICE VISIT (OUTPATIENT)
Dept: UROLOGY | Facility: CLINIC | Age: 73
End: 2018-03-19
Payer: COMMERCIAL

## 2018-03-19 VITALS
DIASTOLIC BLOOD PRESSURE: 72 MMHG | BODY MASS INDEX: 28.79 KG/M2 | TEMPERATURE: 100.2 F | SYSTOLIC BLOOD PRESSURE: 126 MMHG | HEIGHT: 68 IN | WEIGHT: 190 LBS | HEART RATE: 85 BPM | RESPIRATION RATE: 22 BRPM

## 2018-03-19 DIAGNOSIS — R33.9 URINARY RETENTION: Primary | ICD-10-CM

## 2018-03-19 LAB
ALBUMIN UR-MCNC: NEGATIVE MG/DL
APPEARANCE UR: ABNORMAL
BACTERIA #/AREA URNS HPF: ABNORMAL /HPF
BILIRUB UR QL STRIP: NEGATIVE
COLOR UR AUTO: YELLOW
GLUCOSE UR STRIP-MCNC: NEGATIVE MG/DL
HGB UR QL STRIP: ABNORMAL
KETONES UR STRIP-MCNC: NEGATIVE MG/DL
LEUKOCYTE ESTERASE UR QL STRIP: NEGATIVE
NITRATE UR QL: POSITIVE
PH UR STRIP: 5 PH (ref 5–7)
RBC #/AREA URNS AUTO: ABNORMAL /HPF
SOURCE: ABNORMAL
SP GR UR STRIP: >1.03 (ref 1–1.03)
UROBILINOGEN UR STRIP-ACNC: 0.2 EU/DL (ref 0.2–1)
WBC #/AREA URNS AUTO: ABNORMAL /HPF

## 2018-03-19 PROCEDURE — 87086 URINE CULTURE/COLONY COUNT: CPT | Performed by: UROLOGY

## 2018-03-19 PROCEDURE — 51798 US URINE CAPACITY MEASURE: CPT | Performed by: UROLOGY

## 2018-03-19 PROCEDURE — 87088 URINE BACTERIA CULTURE: CPT | Performed by: UROLOGY

## 2018-03-19 PROCEDURE — 81001 URINALYSIS AUTO W/SCOPE: CPT | Performed by: UROLOGY

## 2018-03-19 PROCEDURE — 87186 SC STD MICRODIL/AGAR DIL: CPT | Performed by: UROLOGY

## 2018-03-19 PROCEDURE — 99214 OFFICE O/P EST MOD 30 MIN: CPT | Mod: 25 | Performed by: UROLOGY

## 2018-03-19 RX ORDER — TAMSULOSIN HYDROCHLORIDE 0.4 MG/1
0.4 CAPSULE ORAL
COMMUNITY
Start: 2018-03-13

## 2018-03-19 RX ORDER — SIMVASTATIN 20 MG
20 TABLET ORAL
COMMUNITY
Start: 2018-03-13 | End: 2018-05-16

## 2018-03-19 NOTE — NURSING NOTE
Patient taught to self catheterize per Dr. Casillas. Patient demonstrated the procedure well. Patient was taught with a 14 fr. Catheter. Supplies given to the patient. Patient is to keep urine volume under 400 cc if possible. Patient handouts given.      Jack CHICAS CMA

## 2018-03-19 NOTE — MR AVS SNAPSHOT
"              After Visit Summary   3/19/2018    Karlos Wood    MRN: [id number]           Patient Information     Date Of Birth          1945        Visit Information        Provider Department      3/19/2018 3:00 PM MERY Casillas MD [facility]        Today's Diagnoses     Urinary retention    -  1      Care Instructions    Per Physician's instructions                    Follow-ups after your visit        Your next 10 appointments already scheduled     Apr 09, 2018  8:00 AM CDT   MOHS with Cristino Barron MD   [facility] ([facility])    [address] [address]   [phone]              Who to contact     If you have questions or need follow up information about today's clinic visit or your schedule please contact [facility] directly at [phone].  Normal or non-critical lab and imaging results will be communicated to you by MyChart, letter or phone within 4 business days after the clinic has received the results. If you do not hear from us within 7 days, please contact the clinic through MyChart or phone. If you have a critical or abnormal lab result, we will notify you by phone as soon as possible.  Submit refill requests through [employer] or call your pharmacy and they will forward the refill request to us. Please allow 3 business days for your refill to be completed.          Additional Information About Your Visit        MyChart Information     [employer] lets you send messages to your doctor, view your test results, renew your prescriptions, schedule appointments and more. To sign up, go to [email or web address] . Click on \"Log in\" on the left side of the screen, which will take you to the Welcome page. Then click on \"Sign up Now\" on the right side of the page.     You will be asked to enter the access code listed below, as well as some personal information. Please follow the directions to create your " "username and password.     Your access code is: [email or web address]  Expires: 2018 10:12 AM     Your access code will  in 90 days. If you need help or a new code, please call your [place] clinic or [phone].        Care EveryWhere ID     This is your Care EveryWhere ID. This could be used by other organizations to access your [place] medical records  [id number]        Your Vitals Were     Pulse Temperature Respirations Height BMI (Body Mass Index)       85 100.2  F (37.9  C) (Oral) 22 1.734 m (5' 8.27\") 28.66 kg/m2        Blood Pressure from Last 3 Encounters:   18 131/83   18 101/66   18 126/72    Weight from Last 3 Encounters:   18 86.2 kg (190 lb)   18 86.2 kg (190 lb)   18 87.1 kg (192 lb)              We Performed the Following     MEASURE POST-VOID RESIDUAL URINE/BLADDER CAPACITY, US NON-IMAGING     UA with Microscopic     Urine Culture Aerobic Bacterial        Primary Care Provider Office Phone # Fax #    Terence Thornton [phone] [address] [facility] [address]        Equal Access to Services     DIANA MENESES AH: Hadii lizbeth ku hadasho Soomaali, waaxda luqadaha, qaybta kaalmada adeegyada, waxay idiin hayscottyn davie solano . So [facility] [phone].    ATENCIÓN: Si habla español, tiene a arora disposición servicios gratuitos de asistencia lingüística. ame al [phone].    We comply with applicable federal civil rights laws and [place] laws. We do not discriminate on the basis of race, color, national origin, age, disability, sex, sexual orientation, or gender identity.            Thank you!     Thank you for choosing [facility]  for your care. Our goal is always to provide you with excellent care. Hearing back from our patients is one way we can continue to improve our services. Please take a few minutes to complete the written survey that you may receive in the mail after your visit with us. Thank you!           "   Your Updated Medication List - Protect others around you: Learn how to safely use, store and throw away your medicines at www.disposemymeds.org.          This list is accurate as of 3/19/18 11:59 PM.  Always use your most recent med list.                   Brand Name Dispense Instructions for use Diagnosis    BENADRYL 25 MG tablet   Generic drug:  diphenhydrAMINE     56    1 TABLET EVERY 4 TO 6 HOURS AS NEEDED        CIALIS 10 MG tablet   Generic drug:  tadalafil      Take by mouth daily as needed for erectile dysfunction        diflorasone 0.05 % ointment    PSORCON    30 g    Apply to Leg eczema once daily as needed.    Eczema       docusate sodium 50 MG capsule    COLACE     Take 50 mg by mouth        HYDROcodone-acetaminophen 5-325 MG per tablet    NORCO    30 tablet    Take 1-2 tablets by mouth every 4 hours as needed for other (Moderate to Severe Pain)    Post-op pain       hydrocortisone 2.5 % cream    ANUSOL-HC    30 g    Place rectally 2 times daily    External hemorrhoids       hydrOXYzine 25 MG tablet    ATARAX    60 tablet    Take 1-2 tablets (25-50 mg) by mouth every 6 hours as needed for itching    Rash       IBUPROFEN PO      Take 400 mg by mouth every 6 hours as needed for moderate pain        MICONAZOLE      applying to toes PRN        simvastatin 20 MG tablet    ZOCOR     Take 20 mg by mouth        tamsulosin 0.4 MG capsule    FLOMAX     Take 0.4 mg by mouth        TYLENOL PO      Take 500 mg by mouth

## 2018-03-19 NOTE — NURSING NOTE
Active order to obtain bladder scan? Yes   Name of ordering provider:  Dr. Casillas  Bladder scan preformed post void Yes: .  Bladder scan reveled 114ML  Provider notified?  Yes    Jack CHICAS CMA

## 2018-03-19 NOTE — NURSING NOTE
"Chief Complaint   Patient presents with     Consult     Urinary retention- S/P Hemorrhoidectomy done 3/7/18. Having lots of urinary issues. Went to ER in [place] which catheter was placed. Spencervanessa Jovita removed catheter on 3/13/18. Was taught CIC at that time       Initial /72 (BP Location: Right arm, Patient Position: Sitting, Cuff Size: Adult Regular)  Pulse 85  Temp 100.2  F (37.9  C) (Oral)  Resp 22  Ht 1.734 m (5' 8.27\")  Wt 86.2 kg (190 lb)  BMI 28.66 kg/m2 Estimated body mass index is 28.66 kg/(m^2) as calculated from the following:    Height as of this encounter: 1.734 m (5' 8.27\").    Weight as of this encounter: 86.2 kg (190 lb).  Medication Reconciliation: complete   Jack CHICAS CMA    ]  "

## 2018-03-20 ENCOUNTER — OFFICE VISIT (OUTPATIENT)
Dept: SURGERY | Facility: CLINIC | Age: 73
End: 2018-03-20
Payer: COMMERCIAL

## 2018-03-20 ENCOUNTER — TELEPHONE (OUTPATIENT)
Dept: DERMATOLOGY | Facility: CLINIC | Age: 73
End: 2018-03-20

## 2018-03-20 ENCOUNTER — OFFICE VISIT (OUTPATIENT)
Dept: DERMATOLOGY | Facility: CLINIC | Age: 73
End: 2018-03-20
Payer: COMMERCIAL

## 2018-03-20 VITALS — HEART RATE: 83 BPM | RESPIRATION RATE: 16 BRPM | DIASTOLIC BLOOD PRESSURE: 83 MMHG | SYSTOLIC BLOOD PRESSURE: 131 MMHG

## 2018-03-20 VITALS
WEIGHT: 190 LBS | DIASTOLIC BLOOD PRESSURE: 66 MMHG | BODY MASS INDEX: 28.79 KG/M2 | HEART RATE: 82 BPM | TEMPERATURE: 98.3 F | HEIGHT: 68 IN | SYSTOLIC BLOOD PRESSURE: 101 MMHG

## 2018-03-20 DIAGNOSIS — L81.4 LENTIGO: ICD-10-CM

## 2018-03-20 DIAGNOSIS — N30.01 ACUTE CYSTITIS WITH HEMATURIA: Primary | ICD-10-CM

## 2018-03-20 DIAGNOSIS — C44.319 BASAL CELL CARCINOMA OF BROW: ICD-10-CM

## 2018-03-20 DIAGNOSIS — L82.1 SK (SEBORRHEIC KERATOSIS): Primary | ICD-10-CM

## 2018-03-20 DIAGNOSIS — D18.00 ANGIOMA: ICD-10-CM

## 2018-03-20 PROCEDURE — 88331 PATH CONSLTJ SURG 1 BLK 1SPC: CPT | Performed by: DERMATOLOGY

## 2018-03-20 PROCEDURE — 11100 HC BIOPSY SKIN/SUBQ/MUC MEM, SINGLE LESION: CPT | Performed by: DERMATOLOGY

## 2018-03-20 PROCEDURE — 99203 OFFICE O/P NEW LOW 30 MIN: CPT | Mod: 25 | Performed by: DERMATOLOGY

## 2018-03-20 PROCEDURE — 99024 POSTOP FOLLOW-UP VISIT: CPT | Performed by: SURGERY

## 2018-03-20 RX ORDER — CIPROFLOXACIN 500 MG/1
500 TABLET, FILM COATED ORAL 2 TIMES DAILY
Qty: 20 TABLET | Refills: 0 | Status: SHIPPED | OUTPATIENT
Start: 2018-03-20 | End: 2018-04-23

## 2018-03-20 NOTE — LETTER
[facility]  [address]  [facility] [address]  Phone: [phone]    March 20, 2018    Karlos Mossson                                                                                                               [address] [facility] [address]            Dear Mr. Wood,    You are scheduled for Mohs Surgery on Monday 4/9 @8:00am.    Please check in at Dermatology Clinic.   (2nd Floor, last  Clinic on right up staircase or elevator -past OB/GYN clinic)    You don't need to arrive more than 5-10 minutes prior to your appointment time.     Be sure to eat a good breakfast and bathe and wash your hair prior to Surgery.    If you are taking any anti-coagulants that are prescribed by your Doctor (such as Coumadin/warfarin, Plavix, Aspirin, Ibuprofen), please continue taking them.     However, If you are taking anti-coagulants over the counter without  a Doctor's order for a Medical condition, please discontinue them 10 days prior to Surgery.      Please wear loose comfortable clothing as it could possibly be 4-6 hours until your surgery is completed depending upon how many layers of tissue need to be removed.     Wi-fi access is available.         Thank you,      Cristino Barron MD/ Chel BOOTHE

## 2018-03-20 NOTE — LETTER
3/20/2018         RE: Karlos Wood  [address] [facility] [address]        Dear Colleague,    Thank you for referring your patient, Karlos Wood, to the [facility]. Please see a copy of my visit note below.    Karlos Wood is a 72 year old year old male patient here today for spots on back.   .  Patient states this has been present for a while.  Patient reports the following symptoms:  none .  Patient reports the following previous treatments none.  Patient reports the following modifying factors none.  Associated symptoms: none.  Patient has no other skin complaints today.  Remainder of the HPI, Meds, PMH, Allergies, FH, and SH was reviewed in chart.      Past Medical History:   Diagnosis Date     Elevated prostate specific antigen (PSA)     High Prostate     Other affections of shoulder region, not elsewhere classified     Right Shoulder Impingement     Other chronic sinusitis     Chronic Sinusitis     Pure hypercholesterolemia     High Chol     Varicose veins of legs, with delivery, with or without mention of antepartum condition     Varicose Vein       Past Surgical History:   Procedure Laterality Date     COLONOSCOPY  5/9/2007     FLEXIBLE SIGMOIDOSCOPY  3/27/2001    Flexible Sigmoidoscopy to 55 cm.     HEMORRHOIDECTOMY INTERNAL N/A 3/7/2018    Procedure: HEMORRHOIDECTOMY INTERNAL;  Examination Under Anesthesia & Internal  Hemorrhoidectomy;  Surgeon: Justo Otto MD;  Location: [facility] OR     OSTEOTOMY FOOT  3/27/2014    Procedure: OSTEOTOMY FOOT;  Left Foot 1st Metatarsal Corrective Osteotomy, 3rd Toe Correction;  Surgeon: Delroy Teixeira DPM;  Location: [facility] OR     SURGICAL HISTORY OF -   12/12/2002    Arthroscopic subacromial decompression     SURGICAL HISTORY OF -   1982    Right Ankle Repair for bone chips     SURGICAL HISTORY OF -   3/2009    lipoma on back        Family History   Problem Relation Age of Onset     CANCER Mother      lung     CANCER Father       lung     Breast Cancer Sister      Asthma No family hx of      C.A.D. No family hx of      DIABETES No family hx of      Hypertension No family hx of      CEREBROVASCULAR DISEASE No family hx of      Cancer - colorectal No family hx of      Prostate Cancer No family hx of        Social History     Social History     Marital status:      Spouse name: N/A     Number of children: N/A     Years of education: N/A     Occupational History     Not on file.     Social History Main Topics     Smoking status: Former Smoker     Smokeless tobacco: Never Used     Alcohol use Yes      Comment: 1-2 roberto every other week     Drug use: No     Sexual activity: Yes     Partners: Female     Other Topics Concern     Parent/Sibling W/ Cabg, Mi Or Angioplasty Before 65f 55m? No     Social History Narrative       Outpatient Encounter Prescriptions as of 3/20/2018   Medication Sig Dispense Refill     ciprofloxacin (CIPRO) 500 MG tablet Take 1 tablet (500 mg) by mouth 2 times daily 20 tablet 0     tamsulosin (FLOMAX) 0.4 MG capsule Take 0.4 mg by mouth       docusate sodium (COLACE) 50 MG capsule Take 50 mg by mouth       simvastatin (ZOCOR) 20 MG tablet Take 20 mg by mouth       hydrocortisone (ANUSOL-HC) 2.5 % cream Place rectally 2 times daily 30 g 0     tadalafil (CIALIS) 10 MG tablet Take by mouth daily as needed for erectile dysfunction       Acetaminophen (TYLENOL PO) Take 500 mg by mouth       IBUPROFEN PO Take 400 mg by mouth every 6 hours as needed for moderate pain       diflorasone (PSORCON) 0.05 % ointment Apply to Leg eczema once daily as needed. 30 g 3     BENADRYL 25 MG OR TABS 1 TABLET EVERY 4 TO 6 HOURS AS NEEDED 56 0     MICONAZOLE applying to toes PRN       HYDROcodone-acetaminophen (NORCO) 5-325 MG per tablet Take 1-2 tablets by mouth every 4 hours as needed for other (Moderate to Severe Pain) (Patient not taking: Reported on 3/20/2018) 30 tablet 0     hydrOXYzine (ATARAX) 25 MG tablet Take 1-2 tablets (25-50 mg)  by mouth every 6 hours as needed for itching (Patient not taking: Reported on 3/19/2018) 60 tablet 1     No facility-administered encounter medications on file as of 3/20/2018.              Review Of Systems  Skin: As above  Eyes: negative  Ears/Nose/Throat: negative  Respiratory: No shortness of breath, dyspnea on exertion, cough, or hemoptysis  Cardiovascular: negative  Gastrointestinal: negative  Genitourinary: negative  Musculoskeletal: negative  Neurologic: negative  Psychiatric: negative  Hematologic/Lymphatic/Immunologic: negative  Endocrine: negative      O:   NAD, WDWN, Alert & Oriented, Mood & Affect wnl, Vitals stable   Here today alone   /83  Pulse 83  Resp 16   General appearance normal   Vitals stable   Alert, oriented and in no acute distress      Following lymph nodes palpated: Occipital, Cervical, Supraclavicular no lad   Stuck on papules and brown macules on trunk and ext    Red papules on trunk   r brow 5mm pink pearly papule          The remainder of expanded problem focused exam was unremarkable; the following areas were examined:  scalp/hair, conjunctiva/lids, face, neck, lips, back, ab , chest, digits/nails, RUE, LUE.      Eyes: Conjunctivae/lids:Normal     ENT: Lips, buccal mucosa, tongue: normal    MSK:Normal    Cardiovascular: peripheral edema none    Pulm: Breathing Normal    Lymph Nodes: No Head and Neck Lymphadenopathy     Neuro/Psych: Orientation:Normal; Mood/Affect:Normal      MICRO:   R brow:Orthokeratosis of epidermis with a proliferation of nests of basaloid cells, with peripheral palisading and a haphazard arrangement in the center extending into the dermis, forming nodules.  The tumor cells have hyperchromatic nuclei. Poor cytoplasm and intercellular bridging.    A/P:  1. R brow r/o basal cell carcinoma   TANGENTIAL BIOPSY IN HOUSE:  After consent, anesthesia with LEC and prep, tangential excision performed and dx above confirmed with frozen section histology.  No  complications and routine wound care.  Patient told result basal cell carcinoma schedule excision   2. Seborrheic keratosis, letnigo, angioma  .      BENIGN LESIONS DISCUSSED WITH PATIENT:  I discussed the specifics of tumor, prognosis, and genetics of benign lesions.  I explained that treatment of these lesions would be purely cosmetic and not medically neccessary.  I discussed with patient different removal options including excision, cautery and /or laser.      Nature and genetics of benign skin lesions dicussed with patient.  Signs and Symptoms of skin cancer discussed with patient.  ABCDEs of melanoma reviewed with patient.  Patient encouraged to perform monthly skin exams.  UV precautions reviewed with patient.  Patient to follow up with Primary Care provider regarding elevated blood pressure.  Skin care regimen reviewed with patient: Eliminate harsh soaps, i.e. Dial, zest, irsih spring; Mild soaps such as Cetaphil or Dove sensitive skin, avoid hot or cold showers, aggressive use of emollients including vanicream, cetaphil or cerave discussed with patient.    Risks of non-melanoma skin cancer discussed with patient   Return to clinic for excision       Again, thank you for allowing me to participate in the care of your patient.        Sincerely,        Cristino Barron MD

## 2018-03-20 NOTE — NURSING NOTE
"Initial /66 (BP Location: Right arm, Patient Position: Sitting, Cuff Size: Adult Regular)  Pulse 82  Temp 98.3  F (36.8  C) (Oral)  Ht 1.727 m (5' 8\")  Wt 86.2 kg (190 lb)  BMI 28.89 kg/m2 Estimated body mass index is 28.89 kg/(m^2) as calculated from the following:    Height as of this encounter: 1.727 m (5' 8\").    Weight as of this encounter: 86.2 kg (190 lb). .    Zenaida Barrera MA    "

## 2018-03-20 NOTE — PATIENT INSTRUCTIONS
Proper skin care from Dr. Barron- [facility]     Eliminate harsh soaps, i.e. Dial, Zest, Daysi Spring;   Use mild soaps such as Cetaphil or Dove Sensitive Skin   Avoid hot or cold showers   After showering, lightly dry off.    Aggressive use of a moisturizer (including Vanicream, Cetaphil, Aquaphor or Cerave)   We recommend using a tub that needs to be scooped out, not a pump. This has more of an oil base. It will hold moisture in your skin much better than a water base moisturizer. The ones recommended are non- pore clogging.       If you have any questions call [phone] and follow the prompts to Dr. Barron's office.         Wound Care Instructions     FOR SUPERFICIAL WOUNDS     [facility] [phone]    [facility] [phone]                       AFTER 24 HOURS YOU SHOULD REMOVE THE BANDAGE AND BEGIN DAILY DRESSING CHANGES AS FOLLOWS:     1) Remove Dressing.     2) Clean and dry the area with tap water using a Q-tip or sterile gauze pad.     3) Apply Vaseline, Aquaphor, Polysporin ointment or Bacitracin ointment over entire wound.  Do NOT use Neosporin ointment.     4) Cover the wound with a band-aid, or a sterile non-stick gauze pad and micropore paper tape      REPEAT THESE INSTRUCTIONS AT LEAST ONCE A DAY UNTIL THE WOUND HAS COMPLETELY HEALED.    It is an old wives tale that a wound heals better when it is exposed to air and allowed to dry out. The wound will heal faster with a better cosmetic result if it is kept moist with ointment and covered with a bandage.    **Do not let the wound dry out.**      Supplies Needed:      *Cotton tipped applicators (Q-tips)    *Polysporin Ointment or Bacitracin Ointment (NOT NEOSPORIN)    *Band-aids or non-stick gauze pads and micropore paper tape.      PATIENT INFORMATION:    During the healing process you will notice a number of changes. All wounds develop a small halo of redness surrounding the wound.  This means healing is  occurring. Severe itching with extensive redness usually indicates sensitivity to the ointment or bandage tape used to dress the wound.  You should call our office if this develops.      Swelling  and/or discoloration around your surgical site is common, particularly when performed around the eye.    All wounds normally drain.  The larger the wound the more drainage there will be.  After 7-10 days, you will notice the wound beginning to shrink and new skin will begin to grow.  The wound is healed when you can see skin has formed over the entire area.  A healed wound has a healthy, shiny look to the surface and is red to dark pink in color to normalize.  Wounds may take approximately 4-6 weeks to heal.  Larger wounds may take 6-8 weeks.  After the wound is healed you may discontinue dressing changes.    You may experience a sensation of tightness as your wound heals. This is normal and will gradually subside.    Your healed wound may be sensitive to temperature changes. This sensitivity improves with time, but if you re having a lot of discomfort, try to avoid temperature extremes.    Patients frequently experience itching after their wound appears to have healed because of the continue healing under the skin.  Plain Vaseline will help relieve the itching.        POSSIBLE COMPLICATIONS    BLEEDIN. Leave the bandage in place.  2. Use tightly rolled up gauze or a cloth to apply direct pressure over the bandage for 30  minutes.  3. Reapply pressure for an additional 30 minutes if necessary  4. Use additional gauze and tape to maintain pressure once the bleeding has stopped.

## 2018-03-20 NOTE — TELEPHONE ENCOUNTER
Called and spoke to patient letting him know his biopsy results. Educated patient on Mohs procedure, scheduled appointment, sent letter. Patient voiced understanding.  Chel RN-BSN  [facility]  [phone]

## 2018-03-20 NOTE — MR AVS SNAPSHOT
After Visit Summary   3/20/2018    Karlos Wood    MRN: [id number]           Patient Information     Date Of Birth          1945        Visit Information        Provider Department      3/20/2018 10:45 AM Cristino Barron MD [facility]        Care Instructions    Proper skin care from Dr. Barron- [facility]     Eliminate harsh soaps, i.e. Dial, Zest, Swiss Spring;   Use mild soaps such as Cetaphil or Dove Sensitive Skin   Avoid hot or cold showers   After showering, lightly dry off.    Aggressive use of a moisturizer (including Vanicream, Cetaphil, Aquaphor or Cerave)   We recommend using a tub that needs to be scooped out, not a pump. This has more of an oil base. It will hold moisture in your skin much better than a water base moisturizer. The ones recommended are non- pore clogging.       If you have any questions call [phone] and follow the prompts to Dr. Barron's office.         Wound Care Instructions     FOR SUPERFICIAL WOUNDS     [facility] [phone]    [facility] [phone]                       AFTER 24 HOURS YOU SHOULD REMOVE THE BANDAGE AND BEGIN DAILY DRESSING CHANGES AS FOLLOWS:     1) Remove Dressing.     2) Clean and dry the area with tap water using a Q-tip or sterile gauze pad.     3) Apply Vaseline, Aquaphor, Polysporin ointment or Bacitracin ointment over entire wound.  Do NOT use Neosporin ointment.     4) Cover the wound with a band-aid, or a sterile non-stick gauze pad and micropore paper tape      REPEAT THESE INSTRUCTIONS AT LEAST ONCE A DAY UNTIL THE WOUND HAS COMPLETELY HEALED.    It is an old wives tale that a wound heals better when it is exposed to air and allowed to dry out. The wound will heal faster with a better cosmetic result if it is kept moist with ointment and covered with a bandage.    **Do not let the wound dry out.**      Supplies Needed:      *Cotton tipped applicators  (Q-tips)    *Polysporin Ointment or Bacitracin Ointment (NOT NEOSPORIN)    *Band-aids or non-stick gauze pads and micropore paper tape.      PATIENT INFORMATION:    During the healing process you will notice a number of changes. All wounds develop a small halo of redness surrounding the wound.  This means healing is occurring. Severe itching with extensive redness usually indicates sensitivity to the ointment or bandage tape used to dress the wound.  You should call our office if this develops.      Swelling  and/or discoloration around your surgical site is common, particularly when performed around the eye.    All wounds normally drain.  The larger the wound the more drainage there will be.  After 7-10 days, you will notice the wound beginning to shrink and new skin will begin to grow.  The wound is healed when you can see skin has formed over the entire area.  A healed wound has a healthy, shiny look to the surface and is red to dark pink in color to normalize.  Wounds may take approximately 4-6 weeks to heal.  Larger wounds may take 6-8 weeks.  After the wound is healed you may discontinue dressing changes.    You may experience a sensation of tightness as your wound heals. This is normal and will gradually subside.    Your healed wound may be sensitive to temperature changes. This sensitivity improves with time, but if you re having a lot of discomfort, try to avoid temperature extremes.    Patients frequently experience itching after their wound appears to have healed because of the continue healing under the skin.  Plain Vaseline will help relieve the itching.        POSSIBLE COMPLICATIONS    BLEEDIN. Leave the bandage in place.  2. Use tightly rolled up gauze or a cloth to apply direct pressure over the bandage for 30  minutes.  3. Reapply pressure for an additional 30 minutes if necessary  4. Use additional gauze and tape to maintain pressure once the bleeding has stopped.            Follow-ups after  "your visit        Who to contact     If you have questions or need follow up information about today's clinic visit or your schedule please contact [facility] directly at [phone].  Normal or non-critical lab and imaging results will be communicated to you by MyChart, letter or phone within 4 business days after the clinic has received the results. If you do not hear from us within 7 days, please contact the clinic through MyChart or phone. If you have a critical or abnormal lab result, we will notify you by phone as soon as possible.  Submit refill requests through [facility]4U or call your pharmacy and they will forward the refill request to us. Please allow 3 business days for your refill to be completed.          Additional Information About Your Visit        [facility] Information     Lab4U lets you send messages to your doctor, view your test results, renew your prescriptions, schedule appointments and more. To sign up, go to [email or web address] . Click on \"Log in\" on the left side of the screen, which will take you to the Welcome page. Then click on \"Sign up Now\" on the right side of the page.     You will be asked to enter the access code listed below, as well as some personal information. Please follow the directions to create your username and password.     Your access code is: [email or web address]  Expires: 2018 10:12 AM     Your access code will  in 90 days. If you need help or a new code, please call your [place] clinic or [phone].        Care EveryWhere ID     This is your Care EveryWhere ID. This could be used by other organizations to access your [place] medical records  [id number]        Your Vitals Were     Pulse Respirations                83 16           Blood Pressure from Last 3 Encounters:   18 131/83   18 101/66   18 126/72    Weight from Last 3 Encounters:   18 190 lb (86.2 kg)   18 190 lb (86.2 kg)   18 192 lb (87.1 kg)         "      Today, you had the following     No orders found for display         Today's Medication Changes          These changes are accurate as of 3/20/18 11:19 AM.  If you have any questions, ask your nurse or doctor.               Start taking these medicines.        Dose/Directions    ciprofloxacin 500 MG tablet   Commonly known as:  CIPRO   Used for:  Acute cystitis with hematuria   Started by:  Justo Otto MD        Dose:  500 mg   Take 1 tablet (500 mg) by mouth 2 times daily   Quantity:  20 tablet   Refills:  0            Where to get your medicines      These medications were sent to [facility] [place], [place] - [address]  [address] [address]     Phone:  [phone]     ciprofloxacin 500 MG tablet                Primary Care Provider Office Phone # Fax #    Terence Thornton [phone] [address] [address] [facility] [address]        Equal Access to Services     [facility]: Hadii aad ku hadasho Soomaali, waaxda luqadaha, qaybta kaalmada adeegyada, waxay cindain hayaan davie solano . So [facility] [phone].    ATENCIÓN: Si habla español, tiene a arora disposición servicios gratuitos de asistencia lingüística. Dennis al [phone].    We comply with applicable federal civil rights laws and [place] laws. We do not discriminate on the basis of race, color, national origin, age, disability, sex, sexual orientation, or gender identity.            Thank you!     Thank you for choosing [facility]  for your care. Our goal is always to provide you with excellent care. Hearing back from our patients is one way we can continue to improve our services. Please take a few minutes to complete the written survey that you may receive in the mail after your visit with us. Thank you!             Your Updated Medication List - Protect others around you: Learn how to safely use, store and throw away your medicines at www.disposemymeds.org.           This list is accurate as of 3/20/18 11:19 AM.  Always use your most recent med list.                   Brand Name Dispense Instructions for use Diagnosis    BENADRYL 25 MG tablet   Generic drug:  diphenhydrAMINE     56    1 TABLET EVERY 4 TO 6 HOURS AS NEEDED        CIALIS 10 MG tablet   Generic drug:  tadalafil      Take by mouth daily as needed for erectile dysfunction        ciprofloxacin 500 MG tablet    CIPRO    20 tablet    Take 1 tablet (500 mg) by mouth 2 times daily    Acute cystitis with hematuria       diflorasone 0.05 % ointment    PSORCON    30 g    Apply to Leg eczema once daily as needed.    Eczema       docusate sodium 50 MG capsule    COLACE     Take 50 mg by mouth        HYDROcodone-acetaminophen 5-325 MG per tablet    NORCO    30 tablet    Take 1-2 tablets by mouth every 4 hours as needed for other (Moderate to Severe Pain)    Post-op pain       hydrocortisone 2.5 % cream    ANUSOL-HC    30 g    Place rectally 2 times daily    External hemorrhoids       hydrOXYzine 25 MG tablet    ATARAX    60 tablet    Take 1-2 tablets (25-50 mg) by mouth every 6 hours as needed for itching    Rash       IBUPROFEN PO      Take 400 mg by mouth every 6 hours as needed for moderate pain        MICONAZOLE      applying to toes PRN        simvastatin 20 MG tablet    ZOCOR     Take 20 mg by mouth        tamsulosin 0.4 MG capsule    FLOMAX     Take 0.4 mg by mouth        TYLENOL PO      Take 500 mg by mouth

## 2018-03-20 NOTE — NURSING NOTE
"Chief Complaint   Patient presents with     Derm Problem     Moles on back       Initial /83  Pulse 83  Resp 16 Estimated body mass index is 28.89 kg/(m^2) as calculated from the following:    Height as of an earlier encounter on 3/20/18: 5' 8\" (1.727 m).    Weight as of an earlier encounter on 3/20/18: 190 lb (86.2 kg).  Medication Reconciliation: complete    "

## 2018-03-20 NOTE — LETTER
3/20/2018         RE: Karlos Wood  [address] [address]        Dear Colleague,    Thank you for referring your patient, Karlos Wood, to [facility]. Please see a copy of my visit note below.    Pt returns to see me for a post op visit.  He had internal hemorrhoids removed.  He did well from the surgery, but developed urinary retention that required catherization.  He stated that he has blood in the urine, but not from the rectum area.  He has been having normal BM's and denies pain.    On examination:  His anal incisions are healing well.    His UA showed nitrite, blood and bacteria.    A/P:  UTI.  I gave him a prescription for Cipro.  He is to follow up with Dr. Casillas.    Johann Otto MD, FACS      Again, thank you for allowing me to participate in the care of your patient.        Sincerely,        Justo Otto MD

## 2018-03-20 NOTE — PROGRESS NOTES
Karlos Wood is a 72 year old year old male patient here today for spots on back.   .  Patient states this has been present for a while.  Patient reports the following symptoms:  none .  Patient reports the following previous treatments none.  Patient reports the following modifying factors none.  Associated symptoms: none.  Patient has no other skin complaints today.  Remainder of the HPI, Meds, PMH, Allergies, FH, and SH was reviewed in chart.      Past Medical History:   Diagnosis Date     Elevated prostate specific antigen (PSA)     High Prostate     Other affections of shoulder region, not elsewhere classified     Right Shoulder Impingement     Other chronic sinusitis     Chronic Sinusitis     Pure hypercholesterolemia     High Chol     Varicose veins of legs, with delivery, with or without mention of antepartum condition     Varicose Vein       Past Surgical History:   Procedure Laterality Date     COLONOSCOPY  5/9/2007     FLEXIBLE SIGMOIDOSCOPY  3/27/2001    Flexible Sigmoidoscopy to 55 cm.     HEMORRHOIDECTOMY INTERNAL N/A 3/7/2018    Procedure: HEMORRHOIDECTOMY INTERNAL;  Examination Under Anesthesia & Internal  Hemorrhoidectomy;  Surgeon: Justo Otto MD;  Location: [facility] OR     OSTEOTOMY FOOT  3/27/2014    Procedure: OSTEOTOMY FOOT;  Left Foot 1st Metatarsal Corrective Osteotomy, 3rd Toe Correction;  Surgeon: Delroy Teixeira DPM;  Location: [facility] OR     SURGICAL HISTORY OF -   12/12/2002    Arthroscopic subacromial decompression     SURGICAL HISTORY OF -   1982    Right Ankle Repair for bone chips     SURGICAL HISTORY OF -   3/2009    lipoma on back        Family History   Problem Relation Age of Onset     CANCER Mother      lung     CANCER Father      lung     Breast Cancer Sister      Asthma No family hx of      C.A.D. No family hx of      DIABETES No family hx of      Hypertension No family hx of      CEREBROVASCULAR DISEASE No family hx of      Cancer - colorectal No family hx of       Prostate Cancer No family hx of        Social History     Social History     Marital status:      Spouse name: N/A     Number of children: N/A     Years of education: N/A     Occupational History     Not on file.     Social History Main Topics     Smoking status: Former Smoker     Smokeless tobacco: Never Used     Alcohol use Yes      Comment: 1-2 roberto every other week     Drug use: No     Sexual activity: Yes     Partners: Female     Other Topics Concern     Parent/Sibling W/ Cabg, Mi Or Angioplasty Before 65f 55m? No     Social History Narrative       Outpatient Encounter Prescriptions as of 3/20/2018   Medication Sig Dispense Refill     ciprofloxacin (CIPRO) 500 MG tablet Take 1 tablet (500 mg) by mouth 2 times daily 20 tablet 0     tamsulosin (FLOMAX) 0.4 MG capsule Take 0.4 mg by mouth       docusate sodium (COLACE) 50 MG capsule Take 50 mg by mouth       simvastatin (ZOCOR) 20 MG tablet Take 20 mg by mouth       hydrocortisone (ANUSOL-HC) 2.5 % cream Place rectally 2 times daily 30 g 0     tadalafil (CIALIS) 10 MG tablet Take by mouth daily as needed for erectile dysfunction       Acetaminophen (TYLENOL PO) Take 500 mg by mouth       IBUPROFEN PO Take 400 mg by mouth every 6 hours as needed for moderate pain       diflorasone (PSORCON) 0.05 % ointment Apply to Leg eczema once daily as needed. 30 g 3     BENADRYL 25 MG OR TABS 1 TABLET EVERY 4 TO 6 HOURS AS NEEDED 56 0     MICONAZOLE applying to toes PRN       HYDROcodone-acetaminophen (NORCO) 5-325 MG per tablet Take 1-2 tablets by mouth every 4 hours as needed for other (Moderate to Severe Pain) (Patient not taking: Reported on 3/20/2018) 30 tablet 0     hydrOXYzine (ATARAX) 25 MG tablet Take 1-2 tablets (25-50 mg) by mouth every 6 hours as needed for itching (Patient not taking: Reported on 3/19/2018) 60 tablet 1     No facility-administered encounter medications on file as of 3/20/2018.              Review Of Systems  Skin: As above  Eyes:  negative  Ears/Nose/Throat: negative  Respiratory: No shortness of breath, dyspnea on exertion, cough, or hemoptysis  Cardiovascular: negative  Gastrointestinal: negative  Genitourinary: negative  Musculoskeletal: negative  Neurologic: negative  Psychiatric: negative  Hematologic/Lymphatic/Immunologic: negative  Endocrine: negative      O:   NAD, WDWN, Alert & Oriented, Mood & Affect wnl, Vitals stable   Here today alone   /83  Pulse 83  Resp 16   General appearance normal   Vitals stable   Alert, oriented and in no acute distress      Following lymph nodes palpated: Occipital, Cervical, Supraclavicular no lad   Stuck on papules and brown macules on trunk and ext    Red papules on trunk   r brow 5mm pink pearly papule          The remainder of expanded problem focused exam was unremarkable; the following areas were examined:  scalp/hair, conjunctiva/lids, face, neck, lips, back, ab , chest, digits/nails, RUE, LUE.      Eyes: Conjunctivae/lids:Normal     ENT: Lips, buccal mucosa, tongue: normal    MSK:Normal    Cardiovascular: peripheral edema none    Pulm: Breathing Normal    Lymph Nodes: No Head and Neck Lymphadenopathy     Neuro/Psych: Orientation:Normal; Mood/Affect:Normal      MICRO:   R brow:Orthokeratosis of epidermis with a proliferation of nests of basaloid cells, with peripheral palisading and a haphazard arrangement in the center extending into the dermis, forming nodules.  The tumor cells have hyperchromatic nuclei. Poor cytoplasm and intercellular bridging.    A/P:  1. R brow r/o basal cell carcinoma   TANGENTIAL BIOPSY IN HOUSE:  After consent, anesthesia with LEC and prep, tangential excision performed and dx above confirmed with frozen section histology.  No complications and routine wound care.  Patient told result basal cell carcinoma schedule excision   2. Seborrheic keratosis, letnigo, angioma  .      BENIGN LESIONS DISCUSSED WITH PATIENT:  I discussed the specifics of tumor, prognosis,  and genetics of benign lesions.  I explained that treatment of these lesions would be purely cosmetic and not medically neccessary.  I discussed with patient different removal options including excision, cautery and /or laser.      Nature and genetics of benign skin lesions dicussed with patient.  Signs and Symptoms of skin cancer discussed with patient.  ABCDEs of melanoma reviewed with patient.  Patient encouraged to perform monthly skin exams.  UV precautions reviewed with patient.  Patient to follow up with Primary Care provider regarding elevated blood pressure.  Skin care regimen reviewed with patient: Eliminate harsh soaps, i.e. Dial, zest, irsih spring; Mild soaps such as Cetaphil or Dove sensitive skin, avoid hot or cold showers, aggressive use of emollients including vanicream, cetaphil or cerave discussed with patient.    Risks of non-melanoma skin cancer discussed with patient   Return to clinic for excision

## 2018-03-20 NOTE — TELEPHONE ENCOUNTER
----- Message from Cristino Barron MD sent at 3/20/2018 12:08 PM CDT -----  r brow basal cell carcinoma schedule excision

## 2018-03-20 NOTE — MR AVS SNAPSHOT
"              After Visit Summary   3/20/2018    Karlos Wood    MRN: [id number]           Patient Information     Date Of Birth          1945        Visit Information        Provider Department      3/20/2018 10:00 AM Justo Otto MD [facility]        Today's Diagnoses     Acute cystitis with hematuria    -  1      Care Instructions    Per Dr. Otto's instructions          Follow-ups after your visit        Who to contact     If you have questions or need follow up information about today's clinic visit or your schedule please contact [facility] directly at [phone].  Normal or non-critical lab and imaging results will be communicated to you by MyChart, letter or phone within 4 business days after the clinic has received the results. If you do not hear from us within 7 days, please contact the clinic through MyChart or phone. If you have a critical or abnormal lab result, we will notify you by phone as soon as possible.  Submit refill requests through [facility] or call your pharmacy and they will forward the refill request to us. Please allow 3 business days for your refill to be completed.          Additional Information About Your Visit        MyChart Information     [facility] lets you send messages to your doctor, view your test results, renew your prescriptions, schedule appointments and more. To sign up, go to [email or web address] . Click on \"Log in\" on the left side of the screen, which will take you to the Welcome page. Then click on \"Sign up Now\" on the right side of the page.     You will be asked to enter the access code listed below, as well as some personal information. Please follow the directions to create your username and password.     Your access code is: [email or web address]  Expires: 2018 10:12 AM     Your access code will  in 90 days. If you need help or a new code, please call your [facility] or [phone].        Care EveryWhere ID " "    This is your Care EveryWhere ID. This could be used by other organizations to access your [place] medical records  [id number]        Your Vitals Were     Pulse Temperature Height BMI (Body Mass Index)          82 98.3  F (36.8  C) (Oral) 1.727 m (5' 8\") 28.89 kg/m2         Blood Pressure from Last 3 Encounters:   03/20/18 131/83   03/20/18 101/66   03/19/18 126/72    Weight from Last 3 Encounters:   03/20/18 86.2 kg (190 lb)   03/19/18 86.2 kg (190 lb)   03/07/18 87.1 kg (192 lb)              Today, you had the following     No orders found for display         Today's Medication Changes          These changes are accurate as of 3/20/18 12:15 PM.  If you have any questions, ask your nurse or doctor.               Start taking these medicines.        Dose/Directions    ciprofloxacin 500 MG tablet   Commonly known as:  CIPRO   Used for:  Acute cystitis with hematuria   Started by:  Justo Otto MD        Dose:  500 mg   Take 1 tablet (500 mg) by mouth 2 times daily   Quantity:  20 tablet   Refills:  0            Where to get your medicines      These medications were sent to [facility] [place] [id number] [facility]  [address] [place] [address]     Phone:  [phone]     ciprofloxacin 500 MG tablet                Primary Care Provider Office Phone # Fax #    Terence Thornton [phone] [address] [facility] [address]        Equal Access to Services     [facility] AH: Hadii lizbeth ku hadasho Soomaali, waaxda luqadaha, qaybta kaalmada adeegyada, naomi fernandez. So [facility] [phone].    ATENCIÓN: Si habla español, tiene a arora disposición servicios gratuitos de asistencia lingüística. Llame al [phone].    We comply with applicable federal civil rights laws and [place] laws. We do not discriminate on the basis of race, color, national origin, age, disability, sex, sexual orientation, or gender identity.          "   Thank you!     Thank you for choosing [facility]  for your care. Our goal is always to provide you with excellent care. Hearing back from our patients is one way we can continue to improve our services. Please take a few minutes to complete the written survey that you may receive in the mail after your visit with us. Thank you!             Your Updated Medication List - Protect others around you: Learn how to safely use, store and throw away your medicines at www.disposemymeds.org.          This list is accurate as of 3/20/18 12:15 PM.  Always use your most recent med list.                   Brand Name Dispense Instructions for use Diagnosis    BENADRYL 25 MG tablet   Generic drug:  diphenhydrAMINE     56    1 TABLET EVERY 4 TO 6 HOURS AS NEEDED        CIALIS 10 MG tablet   Generic drug:  tadalafil      Take by mouth daily as needed for erectile dysfunction        ciprofloxacin 500 MG tablet    CIPRO    20 tablet    Take 1 tablet (500 mg) by mouth 2 times daily    Acute cystitis with hematuria       diflorasone 0.05 % ointment    PSORCON    30 g    Apply to Leg eczema once daily as needed.    Eczema       docusate sodium 50 MG capsule    COLACE     Take 50 mg by mouth        HYDROcodone-acetaminophen 5-325 MG per tablet    NORCO    30 tablet    Take 1-2 tablets by mouth every 4 hours as needed for other (Moderate to Severe Pain)    Post-op pain       hydrocortisone 2.5 % cream    ANUSOL-HC    30 g    Place rectally 2 times daily    External hemorrhoids       hydrOXYzine 25 MG tablet    ATARAX    60 tablet    Take 1-2 tablets (25-50 mg) by mouth every 6 hours as needed for itching    Rash       IBUPROFEN PO      Take 400 mg by mouth every 6 hours as needed for moderate pain        MICONAZOLE      applying to toes PRN        simvastatin 20 MG tablet    ZOCOR     Take 20 mg by mouth        tamsulosin 0.4 MG capsule    FLOMAX     Take 0.4 mg by mouth        TYLENOL PO      Take 500 mg by mouth

## 2018-03-20 NOTE — PROGRESS NOTES
Pt returns to see me for a post op visit.  He had internal hemorrhoids removed.  He did well from the surgery, but developed urinary retention that required catherization.  He stated that he has blood in the urine, but not from the rectum area.  He has been having normal BM's and denies pain.    On examination:  His anal incisions are healing well.    His UA showed nitrite, blood and bacteria.    A/P:  UTI.  I gave him a prescription for Cipro.  He is to follow up with Dr. Casillas.    Johann Otto MD, FACS

## 2018-03-21 LAB
BACTERIA SPEC CULT: ABNORMAL
Lab: ABNORMAL
SPECIMEN SOURCE: ABNORMAL

## 2018-03-21 NOTE — PROGRESS NOTES
Appointment source: Established Patient  Patient name: Karlos Wood  Urology Staff: Ryan Casillas MD    Subjective: This is a 72 year old year old male returning for follow up of bladder outlet obstruction.    Objective:  Recently experienced an episode of urinary retention following hemorrhoid surgery.    Assessment:  Catheter was discontinued and he is voiding but will be taught intermittent catheterization for management of possible recurrence of retention.    Urine suspicious for UTI and started empirically on ciprofloxacin to which subsequent culture results validated.    Plan:  Continue tamsulosin and complete ciprofloxacin.    Will ask him to return to urology for follow up in about one month.    Total time 25 minutes, counseling 15 minutes discussing urinary retention

## 2018-04-09 ENCOUNTER — OFFICE VISIT (OUTPATIENT)
Dept: DERMATOLOGY | Facility: CLINIC | Age: 73
End: 2018-04-09
Payer: COMMERCIAL

## 2018-04-09 ENCOUNTER — TELEPHONE (OUTPATIENT)
Dept: DERMATOLOGY | Facility: CLINIC | Age: 73
End: 2018-04-09

## 2018-04-09 VITALS — SYSTOLIC BLOOD PRESSURE: 144 MMHG | OXYGEN SATURATION: 97 % | HEART RATE: 61 BPM | DIASTOLIC BLOOD PRESSURE: 84 MMHG

## 2018-04-09 DIAGNOSIS — C44.319 BASAL CELL CARCINOMA OF BROW: Primary | ICD-10-CM

## 2018-04-09 PROCEDURE — 17311 MOHS 1 STAGE H/N/HF/G: CPT | Performed by: DERMATOLOGY

## 2018-04-09 PROCEDURE — 13152 CMPLX RPR E/N/E/L 2.6-7.5 CM: CPT | Mod: 51 | Performed by: DERMATOLOGY

## 2018-04-09 NOTE — NURSING NOTE
"Initial /84  Pulse 61  SpO2 97% Estimated body mass index is 28.89 kg/(m^2) as calculated from the following:    Height as of 3/20/18: 1.727 m (5' 8\").    Weight as of 3/20/18: 86.2 kg (190 lb). .      "

## 2018-04-09 NOTE — TELEPHONE ENCOUNTER
Called patient. Informed him swelling is normal, but should call back if increased/uncontrollable pain, streaking, fever or chills and/or green/yellow drainage noted. Explained that bruising is also normal.     Ken HUI   [facility] RN

## 2018-04-09 NOTE — PATIENT INSTRUCTIONS
Sutured Wound Care     [facility]: [phone]    [facility]: [phone]          ? No strenuous activity for 48 hours. Resume moderate activity in 48 hours. No heavy exercising until you are seen for follow up in one week.     ? Take Tylenol as needed for discomfort.                         ? Do not drink alcoholic beverages for 48 hours.     ? Keep the pressure bandage in place for 24 hours. If the bandage becomes blood tinged or loose, reinforce it with gauze and tape.        (Refer to the reverse side of this page for management of bleeding).    ? Remove pressure bandage in 24 hours     ? Leave the flat bandage in place until your follow up appointment.    ? Keep the bandage dry. Wash around it carefully.    ? If the tape becomes soiled or starts to come off, reinforce it with additional paper tape.    ? Do not smoke for 3 weeks; smoking is detrimental to wound healing.    ? It is normal to have swelling and bruising around the surgical site. The bruising will fade in approximately 10-14 days. Elevate the area to reduce swelling.    ? Numbness, itchiness and sensitivity to temperature changes can occur after surgery and may take up to 18 months to normalize.      POSSIBLE COMPLICATIONS    BLEEDIN. Leave the bandage in place.  2. Use tightly rolled up gauze or a cloth to apply direct pressure over the bandage for 20   minutes.  3. Reapply pressure for an additional 20 minutes if necessary  4. Call the office or go to the nearest emergency room if pressure fails to stop the bleeding.  5. Use additional gauze and tape to maintain pressure once the bleeding has stopped.        PAIN:    1. Post operative pain should slowly get better, never worse.  2. A severe increase in pain may indicate a problem. Call the office if this occurs.    In case of emergency phone:Dr Barron [phone]

## 2018-04-09 NOTE — MR AVS SNAPSHOT
After Visit Summary   2018    Karlos Wood    MRN: [id number]           Patient Information     Date Of Birth          1945        Visit Information        Provider Department      2018 8:00 AM Cristino Barron MD [facility]        Today's Diagnoses     Basal cell carcinoma of brow    -  1      Care Instructions      Sutured Wound Care     [facility]: [phone]    [facility]: [phone]          ? No strenuous activity for 48 hours. Resume moderate activity in 48 hours. No heavy exercising until you are seen for follow up in one week.     ? Take Tylenol as needed for discomfort.                         ? Do not drink alcoholic beverages for 48 hours.     ? Keep the pressure bandage in place for 24 hours. If the bandage becomes blood tinged or loose, reinforce it with gauze and tape.        (Refer to the reverse side of this page for management of bleeding).    ? Remove pressure bandage in 24 hours     ? Leave the flat bandage in place until your follow up appointment.    ? Keep the bandage dry. Wash around it carefully.    ? If the tape becomes soiled or starts to come off, reinforce it with additional paper tape.    ? Do not smoke for 3 weeks; smoking is detrimental to wound healing.    ? It is normal to have swelling and bruising around the surgical site. The bruising will fade in approximately 10-14 days. Elevate the area to reduce swelling.    ? Numbness, itchiness and sensitivity to temperature changes can occur after surgery and may take up to 18 months to normalize.      POSSIBLE COMPLICATIONS    BLEEDIN. Leave the bandage in place.  2. Use tightly rolled up gauze or a cloth to apply direct pressure over the bandage for 20   minutes.  3. Reapply pressure for an additional 20 minutes if necessary  4. Call the office or go to the nearest emergency room if pressure fails to stop the bleeding.  5. Use additional gauze and tape  "to maintain pressure once the bleeding has stopped.        PAIN:    1. Post operative pain should slowly get better, never worse.  2. A severe increase in pain may indicate a problem. Call the office if this occurs.    In case of emergency phone:Dr Barron [phone]            Follow-ups after your visit        Your next 10 appointments already scheduled     2018  2:45 PM CDT   [facility]    [address] [address] [phone]              Who to contact     If you have questions or need follow up information about today's clinic visit or your schedule please contact [facility] directly at [phone].  Normal or non-critical lab and imaging results will be communicated to you by MyChart, letter or phone within 4 business days after the clinic has received the results. If you do not hear from us within 7 days, please contact the clinic through MyChart or phone. If you have a critical or abnormal lab result, we will notify you by phone as soon as possible.  Submit refill requests through Ch[employer] or call your pharmacy and they will forward the refill request to us. Please allow 3 business days for your refill to be completed.          Additional Information About Your Visit        "[employer] ([email or web address] Information     [employer] lets you send messages to your doctor, view your test results, renew your prescriptions, schedule appointments and more. To sign up, go to [email or web address] . Click on \"Log in\" on the left side of the screen, which will take you to the Welcome page. Then click on \"Sign up Now\" on the right side of the page.     You will be asked to enter the access code listed below, as well as some personal information. Please follow the directions to create your username and password.     Your access code is: [email or web address]  Expires: 2018 10:12 AM     Your access code will  in 90 " days. If you need help or a new code, please call your [facility] clinic or [phone].        Care EveryWhere ID     This is your Care EveryWhere ID. This could be used by other organizations to access your [facility] medical records  [id number]        Your Vitals Were     Pulse Pulse Oximetry                61 97%           Blood Pressure from Last 3 Encounters:   04/09/18 144/84   03/20/18 131/83   03/20/18 101/66    Weight from Last 3 Encounters:   03/20/18 86.2 kg (190 lb)   03/19/18 86.2 kg (190 lb)   03/07/18 87.1 kg (192 lb)              We Performed the Following     MOHS HEAD/NCK/HND/FT/GEN 1ST STAGE UP T0 5 BLOCKS     REPAIR COMPLEX, WOUND LID/NOSE/EAR/LIP 2.6-7.5 CM        Primary Care Provider Office Phone # Fax #    Terence Thornton [phone] [address] [address]        Equal Access to Services     [facility] : Hadii aad ku hadasho Soomaali, waaxda luqadaha, qaybta kaalmada adeegyada, waxay idiin hayaan davie khsherif solano . So [facility] [phone].    ATENCIÓN: Si habla español, tiene a arora disposición servicios gratuitos de asistencia lingüística. Llame al [phone].    We comply with applicable federal civil rights laws and [place] laws. We do not discriminate on the basis of race, color, national origin, age, disability, sex, sexual orientation, or gender identity.            Thank you!     Thank you for choosing [facility]  for your care. Our goal is always to provide you with excellent care. Hearing back from our patients is one way we can continue to improve our services. Please take a few minutes to complete the written survey that you may receive in the mail after your visit with us. Thank you!             Your Updated Medication List - Protect others around you: Learn how to safely use, store and throw away your medicines at www.disposemymeds.org.          This list is accurate as of 4/9/18 10:43 AM.  Always use your most recent  med list.                   Brand Name Dispense Instructions for use Diagnosis    BENADRYL 25 MG tablet   Generic drug:  diphenhydrAMINE     56    1 TABLET EVERY 4 TO 6 HOURS AS NEEDED        CIALIS 10 MG tablet   Generic drug:  tadalafil      Take by mouth daily as needed for erectile dysfunction        ciprofloxacin 500 MG tablet    CIPRO    20 tablet    Take 1 tablet (500 mg) by mouth 2 times daily    Acute cystitis with hematuria       diflorasone 0.05 % ointment    PSORCON    30 g    Apply to Leg eczema once daily as needed.    Eczema       docusate sodium 50 MG capsule    COLACE     Take 50 mg by mouth        HYDROcodone-acetaminophen 5-325 MG per tablet    NORCO    30 tablet    Take 1-2 tablets by mouth every 4 hours as needed for other (Moderate to Severe Pain)    Post-op pain       hydrocortisone 2.5 % cream    ANUSOL-HC    30 g    Place rectally 2 times daily    External hemorrhoids       hydrOXYzine 25 MG tablet    ATARAX    60 tablet    Take 1-2 tablets (25-50 mg) by mouth every 6 hours as needed for itching    Rash       IBUPROFEN PO      Take 400 mg by mouth every 6 hours as needed for moderate pain        MICONAZOLE      applying to toes PRN        simvastatin 20 MG tablet    ZOCOR     Take 20 mg by mouth        tamsulosin 0.4 MG capsule    FLOMAX     Take 0.4 mg by mouth        TYLENOL PO      Take 500 mg by mouth

## 2018-04-09 NOTE — LETTER
4/9/2018         RE: Karlos Wood  [address] [address]        Dear Colleague,    Thank you for referring your patient, Karlos Wood, to the [facility]. Please see a copy of my visit note below.    Karlos Wood is a 72 year old year old male patient here today for evaluation and managment of basal cell carcinoma on right brow.  Patient reports the following previous treatments none.  Patient reports the following modifying factors none.  Associated symptoms: none.  Patient has no other skin complaints today.  Remainder of the HPI, Meds, PMH, Allergies, FH, and SH was reviewed in chart.      Past Medical History:   Diagnosis Date     Basal cell carcinoma      Elevated prostate specific antigen (PSA)     High Prostate     Other affections of shoulder region, not elsewhere classified     Right Shoulder Impingement     Other chronic sinusitis     Chronic Sinusitis     Pure hypercholesterolemia     High Chol     Varicose veins of legs, with delivery, with or without mention of antepartum condition     Varicose Vein       Past Surgical History:   Procedure Laterality Date     COLONOSCOPY  5/9/2007     FLEXIBLE SIGMOIDOSCOPY  3/27/2001    Flexible Sigmoidoscopy to 55 cm.     HEMORRHOIDECTOMY INTERNAL N/A 3/7/2018    Procedure: HEMORRHOIDECTOMY INTERNAL;  Examination Under Anesthesia & Internal  Hemorrhoidectomy;  Surgeon: Justo Otto MD;  Location: [facility] OR     OSTEOTOMY FOOT  3/27/2014    Procedure: OSTEOTOMY FOOT;  Left Foot 1st Metatarsal Corrective Osteotomy, 3rd Toe Correction;  Surgeon: Delroy Teixeira DPM;  Location: [facility] OR     SURGICAL HISTORY OF -   12/12/2002    Arthroscopic subacromial decompression     SURGICAL HISTORY OF -   1982    Right Ankle Repair for bone chips     SURGICAL HISTORY OF -   3/2009    lipoma on back        Family History   Problem Relation Age of Onset     CANCER Mother      lung     CANCER Father      lung     Breast Cancer Sister       Asthma No family hx of      C.A.D. No family hx of      DIABETES No family hx of      Hypertension No family hx of      CEREBROVASCULAR DISEASE No family hx of      Cancer - colorectal No family hx of      Prostate Cancer No family hx of        Social History     Social History     Marital status:      Spouse name: N/A     Number of children: N/A     Years of education: N/A     Occupational History     Not on file.     Social History Main Topics     Smoking status: Former Smoker     Smokeless tobacco: Never Used     Alcohol use Yes      Comment: 1-2 roberto every other week     Drug use: No     Sexual activity: Yes     Partners: Female     Other Topics Concern     Parent/Sibling W/ Cabg, Mi Or Angioplasty Before 65f 55m? No     Social History Narrative       Outpatient Encounter Prescriptions as of 4/9/2018   Medication Sig Dispense Refill     ciprofloxacin (CIPRO) 500 MG tablet Take 1 tablet (500 mg) by mouth 2 times daily 20 tablet 0     tamsulosin (FLOMAX) 0.4 MG capsule Take 0.4 mg by mouth       docusate sodium (COLACE) 50 MG capsule Take 50 mg by mouth       simvastatin (ZOCOR) 20 MG tablet Take 20 mg by mouth       HYDROcodone-acetaminophen (NORCO) 5-325 MG per tablet Take 1-2 tablets by mouth every 4 hours as needed for other (Moderate to Severe Pain) (Patient not taking: Reported on 3/20/2018) 30 tablet 0     hydrocortisone (ANUSOL-HC) 2.5 % cream Place rectally 2 times daily 30 g 0     hydrOXYzine (ATARAX) 25 MG tablet Take 1-2 tablets (25-50 mg) by mouth every 6 hours as needed for itching (Patient not taking: Reported on 3/19/2018) 60 tablet 1     tadalafil (CIALIS) 10 MG tablet Take by mouth daily as needed for erectile dysfunction       Acetaminophen (TYLENOL PO) Take 500 mg by mouth       IBUPROFEN PO Take 400 mg by mouth every 6 hours as needed for moderate pain       diflorasone (PSORCON) 0.05 % ointment Apply to Leg eczema once daily as needed. 30 g 3     BENADRYL 25 MG OR TABS 1 TABLET  EVERY 4 TO 6 HOURS AS NEEDED 56 0     MICONAZOLE applying to toes PRN       No facility-administered encounter medications on file as of 4/9/2018.              Review Of Systems  Skin: As above  Eyes: negative  Ears/Nose/Throat: negative  Respiratory: No shortness of breath, dyspnea on exertion, cough, or hemoptysis  Cardiovascular: negative  Gastrointestinal: negative  Genitourinary: negative  Musculoskeletal: negative  Neurologic: negative  Psychiatric: negative  Hematologic/Lymphatic/Immunologic: negative  Endocrine: negative      O:   NAD, WDWN, Alert & Oriented, Mood & Affect wnl, Vitals stable   Here today alone   /84  Pulse 61  SpO2 97%   General appearance normal   Vitals stable   Alert, oriented and in no acute distress     R brow 5mm scaly papule       Eyes: Conjunctivae/lids:Normal     ENT: Lips, buccal mucosa, tongue: normal    MSK:Normal    Cardiovascular: peripheral edema none    Pulm: Breathing Normal    Neuro/Psych: Orientation:Normal; Mood/Affect:Normal      A/P:  1. R brow basal cell carcinoma   MOHS:   Location    After PGACAC discussed with patient, decision for Mohs surgery was made. Indication for Mohs was Location. Patient confirmed the site with Dr. Barron.  After anesthesia with LEC, the tumor was excised using standard Mohs technique in 1 stages(s).  CLEAR MARGINS OBTAINED and Final defect size was 1.1 cm.       REPAIR COMPLEX: Because of the tightness of the surrounding skin and Because of the size and full thickness nature of the defect, a complex closure was planned. After LEC anesthesia and prep, Burow's triangles were excised in the relaxed skin tension lines. The wound edges were widely undermined by dissection in the subcutaneous plane until adequate tissue mobility was obtained. Hemostasis was obtained. The wound edges were closed in a layered fashion using Vicryl and Fast Absorbing Plain Gut sutures. Postoperative length was 4.2 cm.   EBL minimal; complications none; wound  care routine.  The patient was discharged in good condition and will return in one week for wound evaluation.    BENIGN LESIONS DISCUSSED WITH PATIENT:  I discussed the specifics of tumor, prognosis, and genetics of benign lesions.  I explained that treatment of these lesions would be purely cosmetic and not medically neccessary.  I discussed with patient different removal options including excision, cautery and /or laser.      Nature and genetics of benign skin lesions dicussed with patient.  Signs and Symptoms of skin cancer discussed with patient.  Patient encouraged to perform monthly skin exams.  UV precautions reviewed with patient.  Patient to follow up with Primary Care provider regarding elevated blood pressure.  Skin care regimen reviewed with patient: Eliminate harsh soaps, i.e. Dial, zest, irsih spring; Mild soaps such as Cetaphil or Dove sensitive skin, avoid hot or cold showers, aggressive use of emollients including vanicream, cetaphil or cerave discussed with patient.    Risks of non-melanoma skin cancer discussed with patient   Return to clinic 6 months      Again, thank you for allowing me to participate in the care of your patient.        Sincerely,        Cristino Barron MD

## 2018-04-09 NOTE — TELEPHONE ENCOUNTER
Reason for Call:  Other question    Detailed comments: Pt states his eye lid is swelling post surgery this morning, is that normal? Please call    Phone Number Patient can be reached at: Home number on file [phone] (home)    Best Time: today    Can we leave a detailed message on this number? YES    Call taken on 4/9/2018 at 3:01 PM by Melisa Vázquez

## 2018-04-16 ENCOUNTER — ALLIED HEALTH/NURSE VISIT (OUTPATIENT)
Dept: DERMATOLOGY | Facility: CLINIC | Age: 73
End: 2018-04-16
Payer: COMMERCIAL

## 2018-04-16 DIAGNOSIS — Z48.02 ENCOUNTER FOR REMOVAL OF SUTURES: Primary | ICD-10-CM

## 2018-04-16 PROCEDURE — 99207 ZZC NO CHARGE NURSE ONLY: CPT

## 2018-04-16 NOTE — PATIENT INSTRUCTIONS

## 2018-04-16 NOTE — MR AVS SNAPSHOT
After Visit Summary   4/16/2018    Karlos Wood    MRN: [id number]           Patient Information     Date Of Birth          1945        Visit Information        Provider Department      4/16/2018 1:15 PM Gabriel Beasley [facility]        Care Instructions    WOUND CARE INSTRUCTIONS  for  ONE WEEK AFTER SURGERY          1) Leave flat bandage on your skin for one week after today s bandage change.  2) In one week when you remove the bandage, you may resume your regular skin care routine, including washing with mild soap and water, applying moisturizer, make-up and sunscreen.    3) If there are any open or bleeding areas at the incision/graft site you should begin to cover the area with a bandage daily as follows:    1) Clean and dry the area with plain tap water using a Q-tip or sterile gauze pad.  2) Apply Polysporin or Bacitracin ointment to the open area.  3) Cover the wound with a band-aid or a sterile non-stick gauze pad and micropore paper tape.         SIGNS OF INFECTION  - If you notice any of these signs of infection, call your doctor right away: expanding redness around the wound.  - Yellow or greenish-colored pus or cloudy wound drainage.    - Red streaking spreading from the wound.  - Increased swelling, tenderness, or pain around the wound.   - Fever.    Please remember that yellow and clear drainage from a wound can be normal and related to normal wound healing.  Isolated drainage from a wound without a combination of the above features does not indicate infection.       *Once the bandages are removed, the scar will be red and firm (especially in the lip/chin area). This is normal and will fade in time. It might take 6-12 months for this to happen.     *Massaging the area will help the scar soften and fade quicker. Begin to massage the area one month after the bandages have been removed. To massage apply pressure directly and firmly over the scar with the fingertips  and move in a circular motion. Massage the area for a few minutes several times a day. Continue to massage the site for several months.    *Approximately 6-8 weeks after surgery it is not uncommon to see the formation of  tender pimple-like  bump along the scar. This is normal. As the scar continues to mature and the stitches underneath the skin begin to dissolve, this might occur. Do not pick or squeeze, this will resolve on it s own. Should one break open producing a small amount of drainage, apply Polysporin or Bacitracin ointment a few times a day until the wound is completely healed.    *Numbness in the surgical area is expected. It might take 12-18 months for the feeling to return to normal. During this time sensations of itchiness, tingling and occasional sharp pains might be noted. These feelings are normal and will subside once the nerves have completely healed.         IN CASE OF EMERGENCY: Dr Barron [phone]     If you were seen in [place] call: [phone]    If you were seen in [place] call: [phone]            Follow-ups after your visit        Your next 10 appointments already scheduled     Apr 23, 2018  2:45 PM CDT   [facility]illas MD   [facility]    [address] [address] [phone]              Who to contact     If you have questions or need follow up information about today's clinic visit or your schedule please contact [facility] directly at [phone].  Normal or non-critical lab and imaging results will be communicated to you by MyChart, letter or phone within 4 business days after the clinic has received the results. If you do not hear from us within 7 days, please contact the clinic through MyChart or phone. If you have a critical or abnormal lab result, we will notify you by phone as soon as possible.  Submit refill requests through Nixle or call your pharmacy and they will  "forward the refill request to us. Please allow 3 business days for your refill to be completed.          Additional Information About Your Visit        MyChart Information     SidelineSwap lets you send messages to your doctor, view your test results, renew your prescriptions, schedule appointments and more. To sign up, go to [email or web address] . Click on \"Log in\" on the left side of the screen, which will take you to the Welcome page. Then click on \"Sign up Now\" on the right side of the page.     You will be asked to enter the access code listed below, as well as some personal information. Please follow the directions to create your username and password.     Your access code is: [email or web address]  Expires: 2018 10:12 AM     Your access code will  in 90 days. If you need help or a new code, please call your [place] clinic or [phone].        Care EveryWhere ID     This is your Care EveryWhere ID. This could be used by other organizations to access your [place] medical records  [id number]         Blood Pressure from Last 3 Encounters:   18 144/84   18 131/83   18 101/66    Weight from Last 3 Encounters:   18 86.2 kg (190 lb)   18 86.2 kg (190 lb)   18 87.1 kg (192 lb)              Today, you had the following     No orders found for display       Primary Care Provider Office Phone # Fax #    Terence Thornton [phone] [address] [facility] [address]        Equal Access to Services     [facility]: Hadii aad pratik hadasho Soomaali, waaxda luqadaha, qaybta kaalmada delores, naomi fernandez. So [facility] [phone].    ATENCIÓN: Si habla español, tiene a arora disposición servicios gratuitos de asistencia lingüística. Dennis al [phone].    We comply with applicable federal civil rights laws and [place] laws. We do not discriminate on the basis of race, color, national origin, age, disability, sex, sexual " orientation, or gender identity.            Thank you!     Thank you for choosing [facility]  for your care. Our goal is always to provide you with excellent care. Hearing back from our patients is one way we can continue to improve our services. Please take a few minutes to complete the written survey that you may receive in the mail after your visit with us. Thank you!             Your Updated Medication List - Protect others around you: Learn how to safely use, store and throw away your medicines at www.disposemymeds.org.          This list is accurate as of 4/16/18  1:20 PM.  Always use your most recent med list.                   Brand Name Dispense Instructions for use Diagnosis    BENADRYL 25 MG tablet   Generic drug:  diphenhydrAMINE     56    1 TABLET EVERY 4 TO 6 HOURS AS NEEDED        CIALIS 10 MG tablet   Generic drug:  tadalafil      Take by mouth daily as needed for erectile dysfunction        ciprofloxacin 500 MG tablet    CIPRO    20 tablet    Take 1 tablet (500 mg) by mouth 2 times daily    Acute cystitis with hematuria       diflorasone 0.05 % ointment    PSORCON    30 g    Apply to Leg eczema once daily as needed.    Eczema       docusate sodium 50 MG capsule    COLACE     Take 50 mg by mouth        HYDROcodone-acetaminophen 5-325 MG per tablet    NORCO    30 tablet    Take 1-2 tablets by mouth every 4 hours as needed for other (Moderate to Severe Pain)    Post-op pain       hydrocortisone 2.5 % cream    ANUSOL-HC    30 g    Place rectally 2 times daily    External hemorrhoids       hydrOXYzine 25 MG tablet    ATARAX    60 tablet    Take 1-2 tablets (25-50 mg) by mouth every 6 hours as needed for itching    Rash       IBUPROFEN PO      Take 400 mg by mouth every 6 hours as needed for moderate pain        MICONAZOLE      applying to toes PRN        simvastatin 20 MG tablet    ZOCOR     Take 20 mg by mouth        tamsulosin 0.4 MG capsule    FLOMAX     Take 0.4 mg by mouth         TYLENOL PO      Take 500 mg by mouth

## 2018-04-23 ENCOUNTER — OFFICE VISIT (OUTPATIENT)
Dept: UROLOGY | Facility: CLINIC | Age: 73
End: 2018-04-23
Payer: COMMERCIAL

## 2018-04-23 VITALS
DIASTOLIC BLOOD PRESSURE: 71 MMHG | RESPIRATION RATE: 16 BRPM | HEART RATE: 66 BPM | TEMPERATURE: 98.6 F | SYSTOLIC BLOOD PRESSURE: 129 MMHG

## 2018-04-23 DIAGNOSIS — N30.00 ACUTE CYSTITIS WITHOUT HEMATURIA: Primary | ICD-10-CM

## 2018-04-23 LAB
ALBUMIN UR-MCNC: NEGATIVE MG/DL
APPEARANCE UR: CLEAR
BILIRUB UR QL STRIP: NEGATIVE
COLOR UR AUTO: YELLOW
GLUCOSE UR STRIP-MCNC: NEGATIVE MG/DL
HGB UR QL STRIP: NEGATIVE
KETONES UR STRIP-MCNC: NEGATIVE MG/DL
LEUKOCYTE ESTERASE UR QL STRIP: NEGATIVE
NITRATE UR QL: NEGATIVE
PH UR STRIP: 6 PH (ref 5–7)
RBC #/AREA URNS AUTO: NORMAL /HPF
SOURCE: NORMAL
SP GR UR STRIP: 1.02 (ref 1–1.03)
UROBILINOGEN UR STRIP-ACNC: 0.2 EU/DL (ref 0.2–1)
WBC #/AREA URNS AUTO: NORMAL /HPF

## 2018-04-23 PROCEDURE — 87086 URINE CULTURE/COLONY COUNT: CPT | Performed by: UROLOGY

## 2018-04-23 PROCEDURE — 81001 URINALYSIS AUTO W/SCOPE: CPT | Performed by: UROLOGY

## 2018-04-23 PROCEDURE — 99214 OFFICE O/P EST MOD 30 MIN: CPT | Mod: 25 | Performed by: UROLOGY

## 2018-04-23 PROCEDURE — 51798 US URINE CAPACITY MEASURE: CPT | Performed by: UROLOGY

## 2018-04-23 NOTE — PROGRESS NOTES
Appointment source: Established Patient  Patient name: Karlos Wood  Urology Staff: Ryan Casillas MD    Subjective: This is a 72 year old year old male returning for follow up of urinary retention.    Objective:  Voiding without difficulty now. Post void residual today 41 cc.    Has not needed to drain his bladder with a catheter.    Continuing to take the tamsulosin but inclined to discontinue when the prescription finished.    Taking sildenafil (imported) for erectile dysfunction. Reports using about 150 mg for production of satisfactory erection. No significant side effects noted form this somewhat higher dose.    Assessment:  Doing well with resolution of his urinary retention.    Plan:  Return for follow up in about 6 months.    Total time 25 minutes, counseling 15 minutes discussing urinary retention

## 2018-04-23 NOTE — MR AVS SNAPSHOT
"              After Visit Summary   2018    Karlos Wood    MRN: [id number]           Patient Information     Date Of Birth          1945        Visit Information        Provider Department      2018 2:45 PM MERY Casillas MD [facility]        Today's Diagnoses     Acute cystitis without hematuria    -  1      Care Instructions    Per Physician's instructions            Follow-ups after your visit        Who to contact     If you have questions or need follow up information about today's clinic visit or your schedule please contact [facility] directly at [phone].  Normal or non-critical lab and imaging results will be communicated to you by Napatechhart, letter or phone within 4 business days after the clinic has received the results. If you do not hear from us within 7 days, please contact the clinic through Napatechhart or phone. If you have a critical or abnormal lab result, we will notify you by phone as soon as possible.  Submit refill requests through DailyBooth or call your pharmacy and they will forward the refill request to us. Please allow 3 business days for your refill to be completed.          Additional Information About Your Visit        MyChart Information     DailyBooth lets you send messages to your doctor, view your test results, renew your prescriptions, schedule appointments and more. To sign up, go to [email or web address] . Click on \"Log in\" on the left side of the screen, which will take you to the Welcome page. Then click on \"Sign up Now\" on the right side of the page.     You will be asked to enter the access code listed below, as well as some personal information. Please follow the directions to create your username and password.     Your access code is: [email or web address]  Expires: 2018 10:12 AM     Your access code will  in 90 days. If you need help or a new code, please call your [facility] or [phone].        Care EveryWhere ID     " This is your Care EveryWhere ID. This could be used by other organizations to access your [place] medical records  [id number]        Your Vitals Were     Pulse Temperature Respirations             66 98.6  F (37  C) (Oral) 16          Blood Pressure from Last 3 Encounters:   04/23/18 129/71   04/09/18 144/84   03/20/18 131/83    Weight from Last 3 Encounters:   03/20/18 86.2 kg (190 lb)   03/19/18 86.2 kg (190 lb)   03/07/18 87.1 kg (192 lb)              We Performed the Following     MEASURE POST-VOID RESIDUAL URINE/BLADDER CAPACITY, US NON-IMAGING     UA with Microscopic     Urine Culture Aerobic Bacterial        Primary Care Provider Office Phone # Fax #    Terence Thornton [phone] [address] [facility] [address]        Equal Access to Services     PROSPER MENESES : Hadii lizbeth christie hadasho Soomaali, waaxda luqadaha, qaybta kaalmada adegerardoyada, naomi solano . So [facility] [phone].    ATENCIÓN: Si habla español, tiene a arora disposición servicios gratuitos de asistencia lingüística. Llame al [phone].    We comply with applicable federal civil rights laws and [place] laws. We do not discriminate on the basis of race, color, national origin, age, disability, sex, sexual orientation, or gender identity.            Thank you!     Thank you for choosing [facility]  for your care. Our goal is always to provide you with excellent care. Hearing back from our patients is one way we can continue to improve our services. Please take a few minutes to complete the written survey that you may receive in the mail after your visit with us. Thank you!             Your Updated Medication List - Protect others around you: Learn how to safely use, store and throw away your medicines at www.disposemymeds.org.          This list is accurate as of 4/23/18  6:44 PM.  Always use your most recent med list.                   Brand Name Dispense Instructions for use  Diagnosis    BENADRYL 25 MG tablet   Generic drug:  diphenhydrAMINE     56    1 TABLET EVERY 4 TO 6 HOURS AS NEEDED        CIALIS 10 MG tablet   Generic drug:  tadalafil      Take by mouth daily as needed for erectile dysfunction        diflorasone 0.05 % ointment    PSORCON    30 g    Apply to Leg eczema once daily as needed.    Eczema       docusate sodium 50 MG capsule    COLACE     Take 50 mg by mouth        HYDROcodone-acetaminophen 5-325 MG per tablet    NORCO    30 tablet    Take 1-2 tablets by mouth every 4 hours as needed for other (Moderate to Severe Pain)    Post-op pain       hydrocortisone 2.5 % cream    ANUSOL-HC    30 g    Place rectally 2 times daily    External hemorrhoids       hydrOXYzine 25 MG tablet    ATARAX    60 tablet    Take 1-2 tablets (25-50 mg) by mouth every 6 hours as needed for itching    Rash       IBUPROFEN PO      Take 400 mg by mouth every 6 hours as needed for moderate pain        MICONAZOLE      applying to toes PRN        simvastatin 20 MG tablet    ZOCOR     Take 20 mg by mouth        tamsulosin 0.4 MG capsule    FLOMAX     Take 0.4 mg by mouth        TYLENOL PO      Take 500 mg by mouth

## 2018-04-23 NOTE — NURSING NOTE
Active order to obtain bladder scan? Yes   Name of ordering provider:  Vinny  Bladder scan preformed post void Yes.  Bladder scan reveled 41ML  Provider notified?  Yes    Nohemy VALDEZ CMA

## 2018-04-23 NOTE — NURSING NOTE
"Chief Complaint   Patient presents with     RECHECK     Episode of Retention, pt not using CIC        Initial /71 (BP Location: Right arm, Patient Position: Chair, Cuff Size: Adult Regular)  Pulse 66  Temp 98.6  F (37  C) (Oral)  Resp 16 Estimated body mass index is 28.89 kg/(m^2) as calculated from the following:    Height as of 3/20/18: 1.727 m (5' 8\").    Weight as of 3/20/18: 86.2 kg (190 lb).  BP completed using cuff size: regular  Medications and allergies reviewed.      Nohemy VALDEZ CMA     "

## 2018-04-24 LAB
BACTERIA SPEC CULT: NO GROWTH
Lab: NORMAL
SPECIMEN SOURCE: NORMAL

## 2018-04-26 NOTE — TELEPHONE ENCOUNTER
Patient is due for fasting lipids. [facility]     not applicable (Male)

## 2018-05-16 ENCOUNTER — TELEPHONE (OUTPATIENT)
Dept: FAMILY MEDICINE | Facility: CLINIC | Age: 73
End: 2018-05-16

## 2018-05-16 ENCOUNTER — OFFICE VISIT (OUTPATIENT)
Dept: FAMILY MEDICINE | Facility: CLINIC | Age: 73
End: 2018-05-16
Payer: COMMERCIAL

## 2018-05-16 VITALS — RESPIRATION RATE: 18 BRPM | WEIGHT: 191.6 LBS | BODY MASS INDEX: 29.13 KG/M2 | TEMPERATURE: 97.8 F

## 2018-05-16 DIAGNOSIS — E78.5 HYPERLIPIDEMIA LDL GOAL <130: Primary | ICD-10-CM

## 2018-05-16 DIAGNOSIS — L23.7 CONTACT DERMATITIS DUE TO POISON IVY: Primary | ICD-10-CM

## 2018-05-16 PROCEDURE — 99213 OFFICE O/P EST LOW 20 MIN: CPT | Performed by: NURSE PRACTITIONER

## 2018-05-16 RX ORDER — SIMVASTATIN 20 MG
20 TABLET ORAL AT BEDTIME
Qty: 90 TABLET | Refills: 0 | Status: SHIPPED | OUTPATIENT
Start: 2018-05-16 | End: 2018-08-15

## 2018-05-16 RX ORDER — PREDNISONE 20 MG/1
TABLET ORAL
Qty: 20 TABLET | Refills: 0 | Status: SHIPPED | OUTPATIENT
Start: 2018-05-16 | End: 2018-06-06

## 2018-05-16 ASSESSMENT — PAIN SCALES - GENERAL: PAINLEVEL: NO PAIN (0)

## 2018-05-16 NOTE — PROGRESS NOTES
SUBJECTIVE:   Karlos Wood is a 72 year old male who presents to clinic today for the following health issues:      Concern - Poison Ivy  Onset: Started on Saturday    Description:   Has had Poison Ivy in the past. Has it currently on legs and arms. Some itching. Has been using medication that he had in the past and doesn't seem to helping    Intensity: mild    Progression of Symptoms:  same    Accompanying Signs & Symptoms:  Red patches, itching    Previous history of similar problem:   Yes, in the past    Precipitating factors:   Worsened by: NA    Alleviating factors:  Improved by: NA    Therapies Tried and outcome: steroid cream    Was in the woods Thursday and Friday         Problem list and histories reviewed & adjusted, as indicated.  Additional history: as documented    Patient Active Problem List   Diagnosis     Lipoma of skin and subcutaneous tissue     HYPERLIPIDEMIA LDL GOAL <130     Pain in shoulder     Varicose veins     Trochanteric bursitis     Advanced directives, counseling/discussion     Health Care Home     Bunion     Colon polyps     Past Surgical History:   Procedure Laterality Date     COLONOSCOPY  5/9/2007     FLEXIBLE SIGMOIDOSCOPY  3/27/2001    Flexible Sigmoidoscopy to 55 cm.     HEMORRHOIDECTOMY INTERNAL N/A 3/7/2018    Procedure: HEMORRHOIDECTOMY INTERNAL;  Examination Under Anesthesia & Internal  Hemorrhoidectomy;  Surgeon: Justo Otto MD;  Location: [facility] OR     OSTEOTOMY FOOT  3/27/2014    Procedure: OSTEOTOMY FOOT;  Left Foot 1st Metatarsal Corrective Osteotomy, 3rd Toe Correction;  Surgeon: Delroy Teixeira DPM;  Location: [facility] OR     SURGICAL HISTORY OF -   12/12/2002    Arthroscopic subacromial decompression     SURGICAL HISTORY OF -   1982    Right Ankle Repair for bone chips     SURGICAL HISTORY OF -   3/2009    lipoma on back       Social History   Substance Use Topics     Smoking status: Former Smoker     Smokeless tobacco: Never Used     Alcohol use Yes       Comment: 1-2 roberto every other week     Family History   Problem Relation Age of Onset     CANCER Mother      lung     CANCER Father      lung     Breast Cancer Sister      Asthma No family hx of      C.A.D. No family hx of      DIABETES No family hx of      Hypertension No family hx of      CEREBROVASCULAR DISEASE No family hx of      Cancer - colorectal No family hx of      Prostate Cancer No family hx of          Current Outpatient Prescriptions   Medication Sig Dispense Refill     Acetaminophen (TYLENOL PO) Take 500 mg by mouth       BENADRYL 25 MG OR TABS 1 TABLET EVERY 4 TO 6 HOURS AS NEEDED 56 0     diflorasone (PSORCON) 0.05 % ointment Apply to Leg eczema once daily as needed. 30 g 3     HYDROcodone-acetaminophen (NORCO) 5-325 MG per tablet Take 1-2 tablets by mouth every 4 hours as needed for other (Moderate to Severe Pain) 30 tablet 0     hydrocortisone (ANUSOL-HC) 2.5 % cream Place rectally 2 times daily 30 g 0     IBUPROFEN PO Take 400 mg by mouth every 6 hours as needed for moderate pain       MICONAZOLE applying to toes PRN       predniSONE (DELTASONE) 20 MG tablet Take 3 tabs (60 mg) by mouth daily x 3 days, 2 tabs (40 mg) daily x 3 days, 1 tab (20 mg) daily x 3 days, then 1/2 tab (10 mg) x 3 days. 20 tablet 0     simvastatin (ZOCOR) 20 MG tablet Take 1 tablet (20 mg) by mouth At Bedtime 90 tablet 0     tadalafil (CIALIS) 10 MG tablet Take by mouth daily as needed for erectile dysfunction       tamsulosin (FLOMAX) 0.4 MG capsule Take 0.4 mg by mouth       docusate sodium (COLACE) 50 MG capsule Take 50 mg by mouth       hydrOXYzine (ATARAX) 25 MG tablet Take 1-2 tablets (25-50 mg) by mouth every 6 hours as needed for itching (Patient not taking: Reported on 5/16/2018) 60 tablet 1     Allergies   Allergen Reactions     Cephalosporins      Dicloxacillin      Garamycin [Aminoglycosides]      6/95 PCN-Hosp overnight     Penicillin [Esters]      Zithromax [Azithromycin]      Itchy        Reviewed and  updated as needed this visit by clinical staff  Tobacco  Allergies  Meds  Problems  Med Hx  Surg Hx  Fam Hx  Soc Hx        Reviewed and updated as needed this visit by Provider  Tobacco  Allergies  Meds  Problems  Med Hx  Surg Hx  Fam Hx  Soc Hx          ROS:  Constitutional, HEENT, cardiovascular, pulmonary, gi and gu systems are negative, except as otherwise noted.    OBJECTIVE:     Temp 97.8  F (36.6  C) (Tympanic)  Resp 18  Wt 191 lb 9.6 oz (86.9 kg)  BMI 29.13 kg/m2  Body mass index is 29.13 kg/(m^2).  GENERAL APPEARANCE: healthy, alert and no distress  SKIN: erythematous, vesicular streak like rash on back of bilateral legs, small lesions on lower ankle and arms consistent with contact dermatitis due to poison ivy/oak      Diagnostic Test Results:  none     ASSESSMENT/PLAN:     1. Contact dermatitis due to poison ivy  Continue using steroid cream as well as prednisone   - predniSONE (DELTASONE) 20 MG tablet; Take 3 tabs (60 mg) by mouth daily x 3 days, 2 tabs (40 mg) daily x 3 days, 1 tab (20 mg) daily x 3 days, then 1/2 tab (10 mg) x 3 days.  Dispense: 20 tablet; Refill: 0    Patient Instructions   Continue to use cream on rash     Start oral prednisone taper - follow directions for amount per day     Try not to scratch, wash hands often     If not improving return to clinic     Schedule appointment with Podiatry for toenail removal - Dr. Pierre      Managing a Poison Ivy, Poison Oak, or Poison Sumac Reaction  If you come in contact with urushiol    If you think you may have come in contact with the sap oil (called urushiol) contained in poison ivy, poison oak, or poison sumac plants, wash the affected part of your skin. Do this within 15 minutes after contact. Use water or preferably, soap and water.  Undress, and wash your clothes and gear as soon as you can. Be sure to wash any pet that was with you. Taking these steps can help prevent spreading sap oil to someone else. If you have a rash,  but are not sure if it is from one of these plants, see your healthcare provider.  To soothe the itching  Your skin may react to poison oak, poison ivy, and poison sumac within hours to a few days after contact. Once you have come into contact with these plants, you can t stop the reaction. But you can take these steps to soothe the itching:    Don t scratch or scrub your rash. Not even if the itching is severe. Scratching can lead to infection.    Bathe in lukewarm (not hot) water. Or take short cool showers to relieve the itching. For a more soothing bath, add oatmeal to the water.    Use antihistamines that are taken by mouth. These include diphenhydramine. You can buy these at the pharmacy. Talk to your healthcare provider or pharmacist for more information on oral antihistamines.    Use over-the-counter treatments on your skin. These include cortisone and calamine lotion.  How your skin may react  A mild rash may become red, swollen, and itchy. The rash may form a line on your skin where you brushed against the plant. If you have a severe rash, your itching may worsen. And your rash may blister and ooze. If this happens, seek medical care. The fluid from your blisters will not make your rash spread. With or without medical care, your rash may last up to 3 weeks. In the future, try to avoid coming in contact with these plants.  When to call your healthcare provider  Call your healthcare provider if:    Your rash is severe    The rash spreads beyond the exposed part of your body or affects your face.    The rash does not clear up within a few weeks  You may be given medicine to take by mouth or apply directly on the skin.  Call 911  Call 911 if you have any of the following:    Trouble breathing or swallowing    Any significant swelling  Date Last Reviewed: 3/1/2017    2499-8745 [employer]. [address] [place] [address], [place] [address]. All rights reserved. This information is not intended as a  substitute for professional medical care. Always follow your healthcare professional's instructions.            COURTNEY Suarez [facility]

## 2018-05-16 NOTE — PATIENT INSTRUCTIONS
Continue to use cream on rash     Start oral prednisone taper - follow directions for amount per day     Try not to scratch, wash hands often     If not improving return to clinic     Schedule appointment with Podiatry for toenail removal - Dr. Pierre      Managing a Poison Ivy, Poison Oak, or Poison Sumac Reaction  If you come in contact with urushiol    If you think you may have come in contact with the sap oil (called urushiol) contained in poison ivy, poison oak, or poison sumac plants, wash the affected part of your skin. Do this within 15 minutes after contact. Use water or preferably, soap and water.  Undress, and wash your clothes and gear as soon as you can. Be sure to wash any pet that was with you. Taking these steps can help prevent spreading sap oil to someone else. If you have a rash, but are not sure if it is from one of these plants, see your healthcare provider.  To soothe the itching  Your skin may react to poison oak, poison ivy, and poison sumac within hours to a few days after contact. Once you have come into contact with these plants, you can t stop the reaction. But you can take these steps to soothe the itching:    Don t scratch or scrub your rash. Not even if the itching is severe. Scratching can lead to infection.    Bathe in lukewarm (not hot) water. Or take short cool showers to relieve the itching. For a more soothing bath, add oatmeal to the water.    Use antihistamines that are taken by mouth. These include diphenhydramine. You can buy these at the pharmacy. Talk to your healthcare provider or pharmacist for more information on oral antihistamines.    Use over-the-counter treatments on your skin. These include cortisone and calamine lotion.  How your skin may react  A mild rash may become red, swollen, and itchy. The rash may form a line on your skin where you brushed against the plant. If you have a severe rash, your itching may worsen. And your rash may blister and ooze. If this  happens, seek medical care. The fluid from your blisters will not make your rash spread. With or without medical care, your rash may last up to 3 weeks. In the future, try to avoid coming in contact with these plants.  When to call your healthcare provider  Call your healthcare provider if:    Your rash is severe    The rash spreads beyond the exposed part of your body or affects your face.    The rash does not clear up within a few weeks  You may be given medicine to take by mouth or apply directly on the skin.  Call 911  Call 911 if you have any of the following:    Trouble breathing or swallowing    Any significant swelling  Date Last Reviewed: 3/1/2017    1179-4498 [employer]. [address] [place] [address], [place] [address]. All rights reserved. This information is not intended as a substitute for professional medical care. Always follow your healthcare professional's instructions.

## 2018-05-16 NOTE — MR AVS SNAPSHOT
After Visit Summary   5/16/2018    Karlos Wood    MRN: [id number]           Patient Information     Date Of Birth          1945        Visit Information        Provider Department      5/16/2018 9:40 AM Jana Elaine APRN CNP [facility]        Today's Diagnoses     Contact dermatitis due to poison ivy    -  1      Care Instructions    Continue to use cream on rash     Start oral prednisone taper - follow directions for amount per day     Try not to scratch, wash hands often     If not improving return to clinic     Schedule appointment with Podiatry for toenail removal - Dr. Pierre      Managing a Poison Ivy, Poison Oak, or Poison Sumac Reaction  If you come in contact with urushiol    If you think you may have come in contact with the sap oil (called urushiol) contained in poison ivy, poison oak, or poison sumac plants, wash the affected part of your skin. Do this within 15 minutes after contact. Use water or preferably, soap and water.  Undress, and wash your clothes and gear as soon as you can. Be sure to wash any pet that was with you. Taking these steps can help prevent spreading sap oil to someone else. If you have a rash, but are not sure if it is from one of these plants, see your healthcare provider.  To soothe the itching  Your skin may react to poison oak, poison ivy, and poison sumac within hours to a few days after contact. Once you have come into contact with these plants, you can t stop the reaction. But you can take these steps to soothe the itching:    Don t scratch or scrub your rash. Not even if the itching is severe. Scratching can lead to infection.    Bathe in lukewarm (not hot) water. Or take short cool showers to relieve the itching. For a more soothing bath, add oatmeal to the water.    Use antihistamines that are taken by mouth. These include diphenhydramine. You can buy these at the pharmacy. Talk to your healthcare provider or pharmacist  for more information on oral antihistamines.    Use over-the-counter treatments on your skin. These include cortisone and calamine lotion.  How your skin may react  A mild rash may become red, swollen, and itchy. The rash may form a line on your skin where you brushed against the plant. If you have a severe rash, your itching may worsen. And your rash may blister and ooze. If this happens, seek medical care. The fluid from your blisters will not make your rash spread. With or without medical care, your rash may last up to 3 weeks. In the future, try to avoid coming in contact with these plants.  When to call your healthcare provider  Call your healthcare provider if:    Your rash is severe    The rash spreads beyond the exposed part of your body or affects your face.    The rash does not clear up within a few weeks  You may be given medicine to take by mouth or apply directly on the skin.  Call 911  Call 911 if you have any of the following:    Trouble breathing or swallowing    Any significant swelling  Date Last Reviewed: 3/1/2017    4054-7704 [employer]. [address] [place] [address]. All rights reserved. This information is not intended as a substitute for professional medical care. Always follow your healthcare professional's instructions.                Follow-ups after your visit        Who to contact     If you have questions or need follow up information about today's clinic visit or your schedule please contact [facility] directly at [phone].  Normal or non-critical lab and imaging results will be communicated to you by MyChart, letter or phone within 4 business days after the clinic has received the results. If you do not hear from us within 7 days, please contact the clinic through REachhart or phone. If you have a critical or abnormal lab result, we will notify you by phone as soon as possible.  Submit refill requests through Link To Media or call your pharmacy  "and they will forward the refill request to us. Please allow 3 business days for your refill to be completed.          Additional Information About Your Visit        [employer] Information     Web[employer] lets you send messages to your doctor, view your test results, renew your prescriptions, schedule appointments and more. To sign up, go to [email or web address] . Click on \"Log in\" on the left side of the screen, which will take you to the Welcome page. Then click on \"Sign up Now\" on the right side of the page.     You will be asked to enter the access code listed below, as well as some personal information. Please follow the directions to create your username and password.     Your access code is: [email or web address]  Expires: 2018 10:15 AM     Your access code will  in 90 days. If you need help or a new code, please call your [place] clinic or [phone].        Care EveryWhere ID     This is your [facility] EveryWhere ID. This could be used by other organizations to access your [place] medical records  [id number]        Your Vitals Were     Temperature Respirations BMI (Body Mass Index)             97.8  F (36.6  C) (Tympanic) 18 29.13 kg/m2          Blood Pressure from Last 3 Encounters:   18 129/71   18 144/84   18 131/83    Weight from Last 3 Encounters:   18 191 lb 9.6 oz (86.9 kg)   18 190 lb (86.2 kg)   18 190 lb (86.2 kg)              Today, you had the following     No orders found for display         Today's Medication Changes          These changes are accurate as of 18 10:15 AM.  If you have any questions, ask your nurse or doctor.               Start taking these medicines.        Dose/Directions    predniSONE 20 MG tablet   Commonly known as:  DELTASONE   Used for:  Contact dermatitis due to poison ivy   Started by:  Jana Elaine APRN CNP        Take 3 tabs (60 mg) by mouth daily x 3 days, 2 tabs (40 mg) daily x 3 days, 1 tab (20 mg) daily x 3 days, " then 1/2 tab (10 mg) x 3 days.   Quantity:  20 tablet   Refills:  0         These medicines have changed or have updated prescriptions.        Dose/Directions    simvastatin 20 MG tablet   Commonly known as:  ZOCOR   This may have changed:  when to take this   Used for:  Hyperlipidemia LDL goal <130   Changed by:  Terence Thornton MD        Dose:  20 mg   Take 1 tablet (20 mg) by mouth At Bedtime   Quantity:  90 tablet   Refills:  0            Where to get your medicines      These medications were sent to [facility] [address] [address]     Phone:  [phone]     predniSONE 20 MG tablet    simvastatin 20 MG tablet                Primary Care Provider Office Phone # Fax #    Terence Thornton [phone] [address] [facility] [address]        Equal Access to Services     [facility]: Hadii lizbeth ku hadasho Soomaali, waaxda luqadaha, qaybta kaalmada adeegyada, naomi keating hayaan davie solano . So [facility] [phone].    ATENCIÓN: Si habla español, tiene a arora disposición servicios gratuitos de asistencia lingüística. Ll[facility] [phone].    We comply with applicable federal civil rights laws and [place] laws. We do not discriminate on the basis of race, color, national origin, age, disability, sex, sexual orientation, or gender identity.            Thank you!     Thank you for choosing [facility]  for your care. Our goal is always to provide you with excellent care. Hearing back from our patients is one way we can continue to improve our services. Please take a few minutes to complete the written survey that you may receive in the mail after your visit with us. Thank you!             Your Updated Medication List - Protect others around you: Learn how to safely use, store and throw away your medicines at www.disposemymeds.org.          This list is accurate as of 5/16/18 10:15 AM.   Always use your most recent med list.                   Brand Name Dispense Instructions for use Diagnosis    BENADRYL 25 MG tablet   Generic drug:  diphenhydrAMINE     56    1 TABLET EVERY 4 TO 6 HOURS AS NEEDED        CIALIS 10 MG tablet   Generic drug:  tadalafil      Take by mouth daily as needed for erectile dysfunction        diflorasone 0.05 % ointment    PSORCON    30 g    Apply to Leg eczema once daily as needed.    Eczema       docusate sodium 50 MG capsule    COLACE     Take 50 mg by mouth        HYDROcodone-acetaminophen 5-325 MG per tablet    NORCO    30 tablet    Take 1-2 tablets by mouth every 4 hours as needed for other (Moderate to Severe Pain)    Post-op pain       hydrocortisone 2.5 % cream    ANUSOL-HC    30 g    Place rectally 2 times daily    External hemorrhoids       hydrOXYzine 25 MG tablet    ATARAX    60 tablet    Take 1-2 tablets (25-50 mg) by mouth every 6 hours as needed for itching    Rash       IBUPROFEN PO      Take 400 mg by mouth every 6 hours as needed for moderate pain        MICONAZOLE      applying to toes PRN        predniSONE 20 MG tablet    DELTASONE    20 tablet    Take 3 tabs (60 mg) by mouth daily x 3 days, 2 tabs (40 mg) daily x 3 days, 1 tab (20 mg) daily x 3 days, then 1/2 tab (10 mg) x 3 days.    Contact dermatitis due to poison ivy       simvastatin 20 MG tablet    ZOCOR    90 tablet    Take 1 tablet (20 mg) by mouth At Bedtime    Hyperlipidemia LDL goal <130       tamsulosin 0.4 MG capsule    FLOMAX     Take 0.4 mg by mouth        TYLENOL PO      Take 500 mg by mouth

## 2018-05-16 NOTE — TELEPHONE ENCOUNTER
Reason for Call:  Other prescription    Detailed comments: pt calling stating he is needing this refilled. He would like to pick that up this morning as he is going to [place].    simvastatin (ZOCOR) 20 MG tablet    Phone Number Patient can be reached at: Home number on file [phone] (home)    Best Time: any    Can we leave a detailed message on this number? YES    Call taken on 5/16/2018 at 8:35 AM by Mariam Howard

## 2018-05-16 NOTE — TELEPHONE ENCOUNTER
Routing refill request to provider for review/approval because:  Medication is reported/historical    Thank you  Yanni DUBOIS RN

## 2018-05-16 NOTE — NURSING NOTE
"Chief Complaint   Patient presents with     Derm Problem       Initial Temp 97.8  F (36.6  C) (Tympanic)  Resp 18  Wt 191 lb 9.6 oz (86.9 kg)  BMI 29.13 kg/m2 Estimated body mass index is 29.13 kg/(m^2) as calculated from the following:    Height as of 3/20/18: 5' 8\" (1.727 m).    Weight as of this encounter: 191 lb 9.6 oz (86.9 kg).      Health Maintenance that is potentially due pending provider review:  NONE    Nikkie Ruzi MA  9:54 AM 5/16/2018  .      "

## 2018-06-06 ENCOUNTER — OFFICE VISIT (OUTPATIENT)
Dept: FAMILY MEDICINE | Facility: CLINIC | Age: 73
End: 2018-06-06
Payer: COMMERCIAL

## 2018-06-06 VITALS
DIASTOLIC BLOOD PRESSURE: 76 MMHG | WEIGHT: 189 LBS | SYSTOLIC BLOOD PRESSURE: 130 MMHG | BODY MASS INDEX: 28.64 KG/M2 | HEART RATE: 59 BPM | HEIGHT: 68 IN | RESPIRATION RATE: 18 BRPM | TEMPERATURE: 97.6 F

## 2018-06-06 DIAGNOSIS — L23.7 CONTACT DERMATITIS DUE TO POISON IVY: ICD-10-CM

## 2018-06-06 PROCEDURE — 99213 OFFICE O/P EST LOW 20 MIN: CPT | Performed by: FAMILY MEDICINE

## 2018-06-06 RX ORDER — FLUOCINONIDE 0.5 MG/G
CREAM TOPICAL
Qty: 30 G | Refills: 0 | Status: SHIPPED | OUTPATIENT
Start: 2018-06-06

## 2018-06-06 RX ORDER — PREDNISONE 20 MG/1
TABLET ORAL
Qty: 20 TABLET | Refills: 0 | Status: SHIPPED | OUTPATIENT
Start: 2018-06-06

## 2018-06-06 NOTE — PATIENT INSTRUCTIONS
1.  For itching the best I have found is cold.     2. Use the prednisone  And creams.     3. Remember to avoid in future.

## 2018-06-06 NOTE — PROGRESS NOTES
SUBJECTIVE:   Karlos Wood is a 72 year old male who presents to clinic today for the following health issues:    Chief Complaint   Patient presents with     Poison Ivy     seen 5/16/18 and given Prednisone which helped but [place]. Red, itching blotchy rash on forearms and knees/legs     Rash-Poison Ivy  2nd time around for the poison ivy  Onset: seen 5/16/18 and given prednisone     Description:   Location: bilateral forearms and behind knees/legs.  Character: round, blotchy, red  Itching (Pruritis): YES    Progression of Symptoms:  Improved and worsened again    Accompanying Signs & Symptoms:  Fever: no   Body aches or joint pain: no   Sore throat symptoms: no   Recent cold symptoms: no     History:   Previous similar rash: YES    Precipitating factors:   Exposure to similar rash: YES  New exposures: trees and plants   Recent travel: no     Alleviating factors:  Prednisone and steroid cream        Problem list and histories reviewed & adjusted, as indicated.  Additional history: as documented    Patient Active Problem List   Diagnosis     Lipoma of skin and subcutaneous tissue     HYPERLIPIDEMIA LDL GOAL <130     Pain in shoulder     Varicose veins     Trochanteric bursitis     Advanced directives, counseling/discussion     Health Care Home     Bunion     Colon polyps     Past Surgical History:   Procedure Laterality Date     COLONOSCOPY  5/9/2007     FLEXIBLE SIGMOIDOSCOPY  3/27/2001    Flexible Sigmoidoscopy to 55 cm.     HEMORRHOIDECTOMY INTERNAL N/A 3/7/2018    Procedure: HEMORRHOIDECTOMY INTERNAL;  Examination Under Anesthesia & Internal  Hemorrhoidectomy;  Surgeon: Justo Otto MD;  Location: [facility] OR     OSTEOTOMY FOOT  3/27/2014    Procedure: OSTEOTOMY FOOT;  Left Foot 1st Metatarsal Corrective Osteotomy, 3rd Toe Correction;  Surgeon: Delroy Teixeira DPM;  Location: [facility] OR     SURGICAL HISTORY OF -   12/12/2002    Arthroscopic subacromial  decompression     SURGICAL HISTORY OF -   1982    Right Ankle Repair for bone chips     SURGICAL HISTORY OF -   3/2009    lipoma on back       Social History   Substance Use Topics     Smoking status: Former Smoker     Smokeless tobacco: Never Used     Alcohol use Yes      Comment: 1-2 roberto every other week     Family History   Problem Relation Age of Onset     CANCER Mother      lung     CANCER Father      lung     Breast Cancer Sister      Asthma No family hx of      C.A.D. No family hx of      DIABETES No family hx of      Hypertension No family hx of      CEREBROVASCULAR DISEASE No family hx of      Cancer - colorectal No family hx of      Prostate Cancer No family hx of          Current Outpatient Prescriptions   Medication Sig Dispense Refill     Acetaminophen (TYLENOL PO) Take 500 mg by mouth       BENADRYL 25 MG OR TABS 1 TABLET EVERY 4 TO 6 HOURS AS NEEDED 56 0     diflorasone (PSORCON) 0.05 % ointment Apply to Leg eczema once daily as needed. 30 g 3     fluocinonide (LIDEX) 0.05 % cream Apply sparingly to affected area twice daily for 14 days.  Do not apply to face. 30 g 0     hydrocortisone (ANUSOL-HC) 2.5 % cream Place rectally 2 times daily 30 g 0     hydrOXYzine (ATARAX) 25 MG tablet Take 1-2 tablets (25-50 mg) by mouth every 6 hours as needed for itching 60 tablet 1     IBUPROFEN PO Take 400 mg by mouth every 6 hours as needed for moderate pain       MICONAZOLE applying to toes PRN       predniSONE (DELTASONE) 20 MG tablet Take 3 tabs (60 mg) by mouth daily x 3 days, 2 tabs (40 mg) daily x 3 days, 1 tab (20 mg) daily x 3 days, then 1/2 tab (10 mg) x 3 days. 20 tablet 0     simvastatin (ZOCOR) 20 MG tablet Take 1 tablet (20 mg) by mouth At Bedtime 90 tablet 0     tadalafil (CIALIS) 10 MG tablet Take by mouth daily as needed for erectile dysfunction       tamsulosin (FLOMAX) 0.4 MG capsule Take 0.4 mg by mouth         Reviewed and updated as needed this visit by clinical staff  Tobacco  Allergies   "Med Hx  Surg Hx  Fam Hx  Soc Hx      Reviewed and updated as needed this visit by Provider         ROS:  CONSTITUTIONAL: NEGATIVE for fever, chills, change in weight  ENT/MOUTH: NEGATIVE for ear, mouth and throat problems  RESP: NEGATIVE for significant cough or SOB  CV: NEGATIVE for chest pain, palpitations or peripheral edema    OBJECTIVE:     /76 (BP Location: Right arm, Patient Position: Chair, Cuff Size: Adult Regular)  Pulse 59  Temp 97.6  F (36.4  C) (Tympanic)  Resp 18  Ht 5' 8\" (1.727 m)  Wt 189 lb (85.7 kg)  BMI 28.74 kg/m2  Body mass index is 28.74 kg/(m^2).  GENERAL: healthy, alert and no distress  NECK: no adenopathy, no asymmetry, masses, or scars and thyroid normal to palpation  RECTAL (female):   MS: no gross musculoskeletal defects noted, no edema  SKIN: diffuse rash on arms and legs.        ASSESSMENT/PLAN:     ASSESSMENT/PLAN:      ICD-10-CM    1. Contact dermatitis due to poison ivy L23.7 predniSONE (DELTASONE) 20 MG tablet     fluocinonide (LIDEX) 0.05 % cream       Patient Instructions   1.  For itching the best I have found is cold.     2. Use the prednisone  And creams.     3. Remember to avoid in future.                     Ayaan Zepeda MD  [facility]    "

## 2018-06-06 NOTE — NURSING NOTE
"Chief Complaint   Patient presents with     Poison Ivy     seen 5/16/18 and given Prednisone which helped but was [place]. Red, itching blotchy rash on forearms and knees/legs       Initial /76 (BP Location: Right arm, Patient Position: Chair, Cuff Size: Adult Regular)  Pulse 59  Temp 97.6  F (36.4  C) (Tympanic)  Resp 18  Ht 5' 8\" (1.727 m)  Wt 189 lb (85.7 kg)  BMI 28.74 kg/m2 Estimated body mass index is 28.74 kg/(m^2) as calculated from the following:    Height as of this encounter: 5' 8\" (1.727 m).    Weight as of this encounter: 189 lb (85.7 kg).    Medication Reconciliation: complete  So Leon MA  "

## 2018-06-06 NOTE — MR AVS SNAPSHOT
"              After Visit Summary   2018    Karlos Wood    MRN: [id number]           Patient Information     Date Of Birth          1945        Visit Information        Provider Department      2018 2:20 PM Ayaan Zepeda MD [facility]        Today's Diagnoses     Contact dermatitis due to poison ivy          Care Instructions    1.  For itching the best I have found is cold.     2. Use the prednisone  And creams.     3. Remember to avoid in future.           Follow-ups after your visit        Who to contact     If you have questions or need follow up information about today's clinic visit or your schedule please contact [facility] directly at [phone].  Normal or non-critical lab and imaging results will be communicated to you by MyChart, letter or phone within 4 business days after the clinic has received the results. If you do not hear from us within 7 days, please contact the clinic through [employer] or phone. If you have a critical or abnormal lab result, we will notify you by phone as soon as possible.  Submit refill requests through [employer] or call your pharmacy and they will forward the refill request to us. Please allow 3 business days for your refill to be completed.          Additional Information About Your Visit        MyChart Information     [employer] lets you send messages to your doctor, view your test results, renew your prescriptions, schedule appointments and more. To sign up, go to [email or web address] . Click on \"Log in\" on the left side of the screen, which will take you to the Welcome page. Then click on \"Sign up Now\" on the right side of the page.     You will be asked to enter the access code listed below, as well as some personal information. Please follow the directions to create your username and password.     Your access code is: [email or web address]  Expires: 2018 10:15 AM     Your access code will  in 90 " "days. If you need help or a new code, please call your [place] clinic or [phone].        Care EveryWhere ID     This is your Care EveryWhere ID. This could be used by other organizations to access your [place] medical records  [id number]        Your Vitals Were     Pulse Temperature Respirations Height BMI (Body Mass Index)       59 97.6  F (36.4  C) (Tympanic) 18 5' 8\" (1.727 m) 28.74 kg/m2        Blood Pressure from Last 3 Encounters:   06/06/18 130/76   04/23/18 129/71   04/09/18 144/84    Weight from Last 3 Encounters:   06/06/18 189 lb (85.7 kg)   05/16/18 191 lb 9.6 oz (86.9 kg)   03/20/18 190 lb (86.2 kg)              Today, you had the following     No orders found for display         Today's Medication Changes          These changes are accurate as of 6/6/18  2:35 PM.  If you have any questions, ask your nurse or doctor.               Start taking these medicines.        Dose/Directions    fluocinonide 0.05 % cream   Commonly known as:  LIDEX   Used for:  Contact dermatitis due to poison ivy   Started by:  Ayaan Zepeda MD        Apply sparingly to affected area twice daily for 14 days.  Do not apply to face.   Quantity:  30 g   Refills:  0            Where to get your medicines      These medications were sent to [facility] [address] [address]     Phone:  [phone]     fluocinonide 0.05 % cream    predniSONE 20 MG tablet                Primary Care Provider Office Phone # Fax #    Terence Thornton [phone] [address] [facility] [address]        Equal Access to Services     PROSPER MENESES AH: Nikunj Polanco, williams kasper, harrison estrella, naomi fernandez. So [facility] [phone].    ATENCIÓN: Si habla español, tiene a arora disposición servicios gratuitos de asistencia lingüística. Llame al [phone].    We comply with " applicable federal civil rights laws and [place] laws. We do not discriminate on the basis of race, color, national origin, age, disability, sex, sexual orientation, or gender identity.            Thank you!     Thank you for choosing [facility]  for your care. Our goal is always to provide you with excellent care. Hearing back from our patients is one way we can continue to improve our services. Please take a few minutes to complete the written survey that you may receive in the mail after your visit with us. Thank you!             Your Updated Medication List - Protect others around you: Learn how to safely use, store and throw away your medicines at www.disposemymeds.org.          This list is accurate as of 6/6/18  2:35 PM.  Always use your most recent med list.                   Brand Name Dispense Instructions for use Diagnosis    BENADRYL 25 MG tablet   Generic drug:  diphenhydrAMINE     56    1 TABLET EVERY 4 TO 6 HOURS AS NEEDED        CIALIS 10 MG tablet   Generic drug:  tadalafil      Take by mouth daily as needed for erectile dysfunction        diflorasone 0.05 % ointment    PSORCON    30 g    Apply to Leg eczema once daily as needed.    Eczema       fluocinonide 0.05 % cream    LIDEX    30 g    Apply sparingly to affected area twice daily for 14 days.  Do not apply to face.    Contact dermatitis due to poison ivy       hydrocortisone 2.5 % cream    ANUSOL-HC    30 g    Place rectally 2 times daily    External hemorrhoids       hydrOXYzine 25 MG tablet    ATARAX    60 tablet    Take 1-2 tablets (25-50 mg) by mouth every 6 hours as needed for itching    Rash       IBUPROFEN PO      Take 400 mg by mouth every 6 hours as needed for moderate pain        MICONAZOLE      applying to toes PRN        predniSONE 20 MG tablet    DELTASONE    20 tablet    Take 3 tabs (60 mg) by mouth daily x 3 days, 2 tabs (40 mg) daily x 3 days, 1 tab (20 mg) daily x 3 days, then 1/2 tab (10 mg) x 3 days.     Contact dermatitis due to poison ivy       simvastatin 20 MG tablet    ZOCOR    90 tablet    Take 1 tablet (20 mg) by mouth At Bedtime    Hyperlipidemia LDL goal <130       tamsulosin 0.4 MG capsule    FLOMAX     Take 0.4 mg by mouth        TYLENOL PO      Take 500 mg by mouth

## 2018-08-07 ENCOUNTER — TELEPHONE (OUTPATIENT)
Dept: UROLOGY | Facility: CLINIC | Age: 73
End: 2018-08-07

## 2018-08-07 DIAGNOSIS — R33.9 URINARY RETENTION: Primary | ICD-10-CM

## 2018-08-07 RX ORDER — TAMSULOSIN HYDROCHLORIDE 0.4 MG/1
0.4 CAPSULE ORAL DAILY
Qty: 90 CAPSULE | Refills: 1 | Status: SHIPPED | OUTPATIENT
Start: 2018-08-07

## 2018-08-07 NOTE — TELEPHONE ENCOUNTER
Reason for Call:  Other     Detailed comments: Pt wants to know if he can get a refill of Tamsulosin or if he needs to be seen first - Please advise    PHARMACY:  [facility]    Phone Number Patient can be reached at: Home number on file [phone] (home)    Best Time: Any    Can we leave a detailed message on this number? YES    Call taken on 8/7/2018 at 10:33 AM by Denise Behrendt

## 2018-08-07 NOTE — TELEPHONE ENCOUNTER
Pt to follow up in Oct.  Can continue it if wishes and speak further about it at the Oct Follow up.    Left message on answering machine for patient to call back.    Berna Shields RN  [facility]

## 2018-08-07 NOTE — TELEPHONE ENCOUNTER
I reviewed Karlos's record with Dr Casillas. Dr Casillas recommended an appointment in October and okay to fill his Flomax. Appointment was made for/with Karlos and the Flomax was sent to the requested pharmacy. Kacie SQUIRES Rn

## 2018-08-15 DIAGNOSIS — E78.5 HYPERLIPIDEMIA LDL GOAL <130: ICD-10-CM

## 2018-08-16 RX ORDER — SIMVASTATIN 20 MG
20 TABLET ORAL AT BEDTIME
Qty: 30 TABLET | Refills: 0 | Status: SHIPPED | OUTPATIENT
Start: 2018-08-16 | End: 2018-09-07

## 2018-08-16 NOTE — TELEPHONE ENCOUNTER
Medication is being filled for 1 time refill only due to:  Patient needs labs BMP, Lipid, Glucose, ALT. Future labs ordered YES. Patient needs to be seen because he is due for annual physical.   Patient notified.  Appt scheduled.  Sarah FRAZIER RN

## 2018-08-16 NOTE — TELEPHONE ENCOUNTER
"Requested Prescriptions   Pending Prescriptions Disp Refills     simvastatin (ZOCOR) 20 MG tablet  Last Written Prescription Date:  5/16/2018  Last Fill Quantity: 90,  # refills: 0   Last office visit: 3/5/2018 with prescribing provider:  Hillary   Future Office Visit:   Next 5 appointments (look out 90 days)     Oct 01, 2018  9:30 AM CDT   [facility] Visit with MERY [facility]illas MD   [facility]    [address], [place] [address]   [phone]                  90 tablet 0     Sig: Take 1 tablet (20 mg) by mouth At Bedtime    Statins Protocol Failed    8/15/2018  5:45 PM       Failed - LDL on file in past 12 months    Recent Labs   Lab Test  05/09/17   0907   LDL  105*            Passed - No abnormal creatine kinase in past 12 months    No lab results found.            Passed - Recent (12 mo) or future (30 days) visit within the authorizing provider's specialty    Patient had office visit in the last 12 months or has a visit in the next 30 days with authorizing provider or within the authorizing provider's specialty.  See \"Patient Info\" tab in inbasket, or \"Choose Columns\" in Meds & Orders section of the refill encounter.           Passed - Patient is age 18 or older          "

## 2018-08-28 DIAGNOSIS — E78.5 HYPERLIPIDEMIA LDL GOAL <130: ICD-10-CM

## 2018-08-28 LAB
ALT SERPL W P-5'-P-CCNC: 33 U/L (ref 0–70)
ANION GAP SERPL CALCULATED.3IONS-SCNC: 6 MMOL/L (ref 3–14)
BUN SERPL-MCNC: 15 MG/DL (ref 7–30)
CALCIUM SERPL-MCNC: 8.5 MG/DL (ref 8.5–10.1)
CHLORIDE SERPL-SCNC: 109 MMOL/L (ref 94–109)
CHOLEST SERPL-MCNC: 142 MG/DL
CO2 SERPL-SCNC: 24 MMOL/L (ref 20–32)
CREAT SERPL-MCNC: 0.95 MG/DL (ref 0.66–1.25)
GFR SERPL CREATININE-BSD FRML MDRD: 78 ML/MIN/1.7M2
GLUCOSE SERPL-MCNC: 104 MG/DL (ref 70–99)
HDLC SERPL-MCNC: 34 MG/DL
LDLC SERPL CALC-MCNC: 56 MG/DL
NONHDLC SERPL-MCNC: 108 MG/DL
POTASSIUM SERPL-SCNC: 3.9 MMOL/L (ref 3.4–5.3)
SODIUM SERPL-SCNC: 139 MMOL/L (ref 133–144)
TRIGL SERPL-MCNC: 258 MG/DL

## 2018-08-28 PROCEDURE — 36415 COLL VENOUS BLD VENIPUNCTURE: CPT | Performed by: FAMILY MEDICINE

## 2018-08-28 PROCEDURE — 80061 LIPID PANEL: CPT | Performed by: FAMILY MEDICINE

## 2018-08-28 PROCEDURE — 80048 BASIC METABOLIC PNL TOTAL CA: CPT | Performed by: FAMILY MEDICINE

## 2018-08-28 PROCEDURE — 84460 ALANINE AMINO (ALT) (SGPT): CPT | Performed by: FAMILY MEDICINE

## 2018-09-07 ENCOUNTER — OFFICE VISIT (OUTPATIENT)
Dept: FAMILY MEDICINE | Facility: CLINIC | Age: 73
End: 2018-09-07
Payer: COMMERCIAL

## 2018-09-07 VITALS
WEIGHT: 187 LBS | HEART RATE: 63 BPM | TEMPERATURE: 96.6 F | SYSTOLIC BLOOD PRESSURE: 133 MMHG | BODY MASS INDEX: 27.7 KG/M2 | DIASTOLIC BLOOD PRESSURE: 76 MMHG | HEIGHT: 69 IN | RESPIRATION RATE: 22 BRPM

## 2018-09-07 DIAGNOSIS — R33.9 URINARY RETENTION: ICD-10-CM

## 2018-09-07 DIAGNOSIS — E78.5 HYPERLIPIDEMIA LDL GOAL <130: ICD-10-CM

## 2018-09-07 DIAGNOSIS — L98.9 SKIN LESION: ICD-10-CM

## 2018-09-07 DIAGNOSIS — Z00.00 ROUTINE GENERAL MEDICAL EXAMINATION AT A HEALTH CARE FACILITY: Primary | ICD-10-CM

## 2018-09-07 DIAGNOSIS — Z23 NEED FOR PROPHYLACTIC VACCINATION AND INOCULATION AGAINST INFLUENZA: ICD-10-CM

## 2018-09-07 PROCEDURE — 99397 PER PM REEVAL EST PAT 65+ YR: CPT | Mod: 25 | Performed by: FAMILY MEDICINE

## 2018-09-07 PROCEDURE — G0008 ADMIN INFLUENZA VIRUS VAC: HCPCS | Performed by: FAMILY MEDICINE

## 2018-09-07 PROCEDURE — 90662 IIV NO PRSV INCREASED AG IM: CPT | Performed by: FAMILY MEDICINE

## 2018-09-07 RX ORDER — SIMVASTATIN 20 MG
20 TABLET ORAL AT BEDTIME
Qty: 90 TABLET | Refills: 3 | Status: SHIPPED | OUTPATIENT
Start: 2018-09-07

## 2018-09-07 RX ORDER — TAMSULOSIN HYDROCHLORIDE 0.4 MG/1
0.4 CAPSULE ORAL DAILY
Qty: 90 CAPSULE | Refills: 1 | Status: CANCELLED | OUTPATIENT
Start: 2018-09-07

## 2018-09-07 NOTE — MR AVS SNAPSHOT
After Visit Summary   9/7/2018    Karlos Wood    MRN: [id number]           Patient Information     Date Of Birth          1945        Visit Information        Provider Department      9/7/2018 9:40 AM Terence Thornton MD [facility]        Today's Diagnoses     Routine general medical examination at a health care facility    -  1    Hyperlipidemia LDL goal <130        Urinary retention        Skin lesion          Care Instructions      Preventive Health Recommendations:   Male Ages 65 and over    Yearly exam:             See your health care provider every year in order to  o   Review health changes.   o   Discuss preventive care.    o   Review your medicines if your doctor has prescribed any.    Talk with your health care provider about whether you should have a test to screen for prostate cancer (PSA).    Every 3 years, have a diabetes test (fasting glucose). If you are at risk for diabetes, you should have this test more often.    Every 5 years, have a cholesterol test. Have this test more often if you are at risk for high cholesterol or heart disease.     Every 10 years, have a colonoscopy. Or, have a yearly FIT test (stool test). These exams will check for colon cancer.    Talk to with your health care provider about screening for Abdominal Aortic Aneurysm if you have a family history of AAA or have a history of smoking.    Shots:     Get a flu shot each year.     Get a tetanus shot every 10 years.     Talk to your doctor about your pneumonia vaccines. There are now two you should receive - Pneumovax (PPSV 23) and Prevnar (PCV 13).     Talk to your pharmacist about a shingles vaccine.     Talk to your doctor about the hepatitis B vaccine.  Nutrition:     Eat at least 5 servings of fruits and vegetables each day.     Eat whole-grain bread, whole-wheat pasta and brown rice instead of white grains and rice.     Get adequate Calcium and Vitamin D.   Lifestyle    Exercise  for at least 150 minutes a week (30 minutes a day, 5 days a week). This will help you control your weight and prevent disease.     Limit alcohol to one drink per day.     No smoking.     Wear sunscreen to prevent skin cancer.     See your dentist every six months for an exam and cleaning.     See your eye doctor every 1 to 2 years to screen for conditions such as glaucoma, macular degeneration, cataracts, etc           Follow-ups after your visit        Additional Services     DERMATOLOGY REFERRAL       Your provider has referred you to: FM[facility]: [facility] [phone]   [email or web address]    Please be aware that coverage of these services is subject to the terms and limitations of your health insurance plan.  Call member services at your health plan with any benefit or coverage questions.      Please bring the following with you to your appointment:    (1) Any X-Rays, CTs or MRIs which have been performed.  Contact the facility where they were done to arrange for  prior to your scheduled appointment.    (2) List of current medications  (3) This referral request   (4) Any documents/labs given to you for this referral                  Your next 10 appointments already scheduled     Oct 08, 2018  1:00 PM CDT   Return Visit with MERY Casillas MD   [facility]    [address] [facility] [address] [phone]              Who to contact     If you have questions or need follow up information about today's clinic visit or your schedule please contact [facility] directly at [phone].  Normal or non-critical lab and imaging results will be communicated to you by MyChart, letter or phone within 4 business days after the clinic has received the results. If you do not hear from us within 7 days, please contact the clinic through MyChart or phone. If you have a critical or abnormal lab result,  "we will notify you by phone as soon as possible.  Submit refill requests through [employer] or call your pharmacy and they will forward the refill request to us. Please allow 3 business days for your refill to be completed.          Additional Information About Your Visit        Care EveryWhere ID     This is your Care EveryWhere ID. This could be used by other organizations to access your [place] medical records  [id number]        Your Vitals Were     Pulse Temperature Respirations Height BMI (Body Mass Index)       63 96.6  F (35.9  C) (Tympanic) 22 5' 8.5\" (1.74 m) 28.02 kg/m2        Blood Pressure from Last 3 Encounters:   09/07/18 133/76   06/06/18 130/76   04/23/18 129/71    Weight from Last 3 Encounters:   09/07/18 187 lb (84.8 kg)   06/06/18 189 lb (85.7 kg)   05/16/18 191 lb 9.6 oz (86.9 kg)              We Performed the Following     DERMATOLOGY REFERRAL          Where to get your medicines      These medications were sent to [facility] - - [place] - [id number] [address]    Hours:  [place] [facility] Phone:  [phone]     simvastatin 20 MG tablet          Primary Care Provider Office Phone # Fax #    Ternece Thornton [phone] [address] [facility] [address]        Equal Access to Services     [facility] Psych[facility] PavilionFIORELLA AH: Hadii lizbeth ku hadasho Sonegroali, waaxda luqadaha, qaybta kaalmada adeegyada, naomi fernandez. So [facility] [phone].    ATENCIÓN: Si habla español, tiene a arora disposición servicios gratuitos de asistencia lingüística. Llame al [phone].    We comply with applicable federal civil rights laws and [place] laws. We do not discriminate on the basis of race, color, national origin, age, disability, sex, sexual orientation, or gender identity.            Thank you!     Thank you for choosing [facility]  for your care. Our goal is always to provide " you with excellent care. Hearing back from our patients is one way we can continue to improve our services. Please take a few minutes to complete the written survey that you may receive in the mail after your visit with us. Thank you!             Your Updated Medication List - Protect others around you: Learn how to safely use, store and throw away your medicines at www.disposemymeds.org.          This list is accurate as of 9/7/18 10:17 AM.  Always use your most recent med list.                   Brand Name Dispense Instructions for use Diagnosis    BENADRYL 25 MG tablet   Generic drug:  diphenhydrAMINE     56    1 TABLET EVERY 4 TO 6 HOURS AS NEEDED        CIALIS 10 MG tablet   Generic drug:  tadalafil      Take by mouth daily as needed for erectile dysfunction        diflorasone 0.05 % ointment    PSORCON    30 g    Apply to Leg eczema once daily as needed.    Eczema       fluocinonide 0.05 % cream    LIDEX    30 g    Apply sparingly to affected area twice daily for 14 days.  Do not apply to face.    Contact dermatitis due to poison ivy       hydrocortisone 2.5 % cream    ANUSOL-HC    30 g    Place rectally 2 times daily    External hemorrhoids       hydrOXYzine 25 MG tablet    ATARAX    60 tablet    Take 1-2 tablets (25-50 mg) by mouth every 6 hours as needed for itching    Rash       IBUPROFEN PO      Take 400 mg by mouth every 6 hours as needed for moderate pain        MICONAZOLE      applying to toes PRN        predniSONE 20 MG tablet    DELTASONE    20 tablet    Take 3 tabs (60 mg) by mouth daily x 3 days, 2 tabs (40 mg) daily x 3 days, 1 tab (20 mg) daily x 3 days, then 1/2 tab (10 mg) x 3 days.    Contact dermatitis due to poison ivy       simvastatin 20 MG tablet    ZOCOR    90 tablet    Take 1 tablet (20 mg) by mouth At Bedtime    Hyperlipidemia LDL goal <130       * tamsulosin 0.4 MG capsule    FLOMAX     Take 0.4 mg by mouth        * tamsulosin 0.4 MG capsule    FLOMAX    90 capsule    Take 1 capsule  (0.4 mg) by mouth daily    Urinary retention       TYLENOL PO      Take 500 mg by mouth        * Notice:  This list has 2 medication(s) that are the same as other medications prescribed for you. Read the directions carefully, and ask your doctor or other care provider to review them with you.

## 2018-09-07 NOTE — PROGRESS NOTES
SUBJECTIVE:   Karlos Wood is a 72 year old male who presents for Preventive Visit.      Are you in the first 12 months of your Medicare Part B coverage?  No    Healthy Habits:    Do you get at least three servings of calcium containing foods daily (dairy, green leafy vegetables, etc.)? yes    Amount of exercise or daily activities, outside of work: 5-7 day(s) per week    Problems taking medications regularly No    Medication side effects: No    Have you had an eye exam in the past two years? yes    Do you see a dentist twice per year? No- once annually    Do you have sleep apnea, excessive snoring or daytime drowsiness?yes      Ability to successfully perform activities of daily living: Yes, no assistance needed    Home safety:  none identified     Hearing impairment: Yes, ringing in ears    Fall risk:  Fallen 2 or more times in the past year?: Yes (because of ice)  Any fall with injury in the past year?: No        COGNITIVE SCREEN  1) Repeat 3 items (Leader, Season, Table)    2) Clock draw: NORMAL  3) 3 item recall: Recalls 2 objects   Results: NORMAL clock, 1-2 items recalled: COGNITIVE IMPAIRMENT LESS LIKELY    Mini-CogTM Copyright S Alexis. Licensed by the author for use in [facility]; reprinted with permission (sotom@[facility]). All rights reserved.                Reviewed and updated as needed this visit by clinical staff  Tobacco  Allergies  Meds  Med Hx  Surg Hx  Fam Hx  Soc Hx        Reviewed and updated as needed this visit by Provider        Social History   Substance Use Topics     Smoking status: Former Smoker     Smokeless tobacco: Never Used     Alcohol use Yes      Comment: 1-2 roberto every other week       If you drink alcohol do you typically have >3 drinks per day or >7 drinks per week? No                        Today's PHQ-2 Score:   PHQ-2 ( 1999 Pf[employer]) 9/7/2018 5/10/2017   Q1: Little interest or pleasure in doing things 0 0   Q2: Feeling down, depressed or hopeless 1 0  "  PHQ-2 Score 1 0       Do you feel safe in your environment - Yes    Do you have a Health Care Directive?: Yes: Advance Directive has been received and scanned.    Current providers sharing in care for this patient include:   Patient Care Team:  Terence Thornton MD as PCP - General    The following health maintenance items are reviewed in Epic and correct as of today:  Health Maintenance   Topic Date Due     AORTIC ANEURYSM SCREENING (SYSTEM ASSIGNED)  11/28/2008     FALL RISK ASSESSMENT  05/10/2018     PHQ-2 Q1 YR  05/10/2018     INFLUENZA VACCINE (1) 09/01/2018     COLONOSCOPY Q5 YR  09/23/2019     ADVANCE DIRECTIVE PLANNING Q5 YRS  03/05/2023     TETANUS IMMUNIZATION (SYSTEM ASSIGNED)  07/17/2023     LIPID SCREEN Q5 YR MALE (SYSTEM ASSIGNED)  08/28/2023     PNEUMOCOCCAL  Completed     Labs reviewed in EPIC        ROS:  Constitutional, HEENT, cardiovascular, pulmonary, GI, , musculoskeletal, neuro, skin, endocrine and psych systems are negative, except as otherwise noted.    OBJECTIVE:   /76  Pulse 63  Temp 96.6  F (35.9  C) (Tympanic)  Resp 22  Ht 5' 8.5\" (1.74 m)  Wt 187 lb (84.8 kg)  BMI 28.02 kg/m2 Estimated body mass index is 28.02 kg/(m^2) as calculated from the following:    Height as of this encounter: 5' 8.5\" (1.74 m).    Weight as of this encounter: 187 lb (84.8 kg).  EXAM:   GENERAL: healthy, alert and no distress  EYES: Eyes grossly normal to inspection, PERRL and conjunctivae and sclerae normal  HENT: ear canals and TM's normal, nose and mouth without ulcers or lesions  NECK: no adenopathy, no asymmetry, masses, or scars and thyroid normal to palpation  RESP: lungs clear to auscultation - no rales, rhonchi or wheezes  CV: regular rate and rhythm, normal S1 S2, no S3 or S4, no murmur, click or rub, no peripheral edema and peripheral pulses strong  ABDOMEN: soft, nontender, no hepatosplenomegaly, no masses and bowel sounds normal  MS: no gross musculoskeletal defects noted, no edema  SKIN: " "no suspicious lesions or rashes  NEURO: Normal strength and tone, mentation intact and speech normal  PSYCH: mentation appears normal, affect normal/bright    Diagnostic Test Results:  none     ASSESSMENT / PLAN:   Karlos was seen today for physical.    Diagnoses and all orders for this visit:    Routine general medical examination at a health care facility    Hyperlipidemia LDL goal <130  -     simvastatin (ZOCOR) 20 MG tablet; Take 1 tablet (20 mg) by mouth At Bedtime    Urinary retention    Skin lesion  -     DERMATOLOGY REFERRAL    Need for prophylactic vaccination and inoculation against influenza  -     FLU VACCINE, INCREASED ANTIGEN, PRESV FREE, AGE 65+ [96090]    Other orders  -     Cancel: tamsulosin (FLOMAX) 0.4 MG capsule; Take 1 capsule (0.4 mg) by mouth daily        End of Life Planning:  Patient currently has an advanced directive:     COUNSELING:  Reviewed preventive health counseling, as reflected in patient instructions       Regular exercise       Healthy diet/nutrition    BP Readings from Last 1 Encounters:   09/07/18 133/76     Estimated body mass index is 28.02 kg/(m^2) as calculated from the following:    Height as of this encounter: 5' 8.5\" (1.74 m).    Weight as of this encounter: 187 lb (84.8 kg).           reports that he has quit smoking. He has never used smokeless tobacco.      Appropriate preventive services were discussed with this patient, including applicable screening as appropriate for cardiovascular disease, diabetes, osteopenia/osteoporosis, and glaucoma.  As appropriate for age/gender, discussed screening for colorectal cancer, prostate cancer, breast cancer, and cervical cancer. Checklist reviewing preventive services available has been given to the patient.    Reviewed patients plan of care and provided an AVS. The Basic Care Plan (routine screening as documented in Health Maintenance) for Karlos meets the Care Plan requirement. This Care Plan has been established and reviewed " with the Patient.    Counseling Resources:  ATP IV Guidelines  Pooled Cohorts Equation Calculator  Breast Cancer Risk Calculator  FRAX Risk Assessment  ICSI Preventive Guidelines  Dietary Guidelines for Americans, 2010  USDA's MyPlate  ASA Prophylaxis  Lung CA Screening    Terence Thornton MD  [facility]    Injectable Influenza Immunization Documentation    1.  Is the person to be vaccinated sick today?   No    2. Does the person to be vaccinated have an allergy to a component   of the vaccine?   No  Egg Allergy Algorithm Link    3. Has the person to be vaccinated ever had a serious reaction   to influenza vaccine in the past?   No    4. Has the person to be vaccinated ever had Guillain-Barré syndrome?   No    Form completed by Luana Meraz / Certified Medical Assistant......9/7/2018 10:24 AM

## 2018-10-08 ENCOUNTER — OFFICE VISIT (OUTPATIENT)
Dept: UROLOGY | Facility: CLINIC | Age: 73
End: 2018-10-08
Payer: COMMERCIAL

## 2018-10-08 VITALS
WEIGHT: 187 LBS | SYSTOLIC BLOOD PRESSURE: 125 MMHG | HEIGHT: 69 IN | HEART RATE: 61 BPM | DIASTOLIC BLOOD PRESSURE: 72 MMHG | BODY MASS INDEX: 27.7 KG/M2 | RESPIRATION RATE: 18 BRPM | TEMPERATURE: 97.1 F

## 2018-10-08 DIAGNOSIS — N32.0 BLADDER OUTLET OBSTRUCTION: ICD-10-CM

## 2018-10-08 DIAGNOSIS — R33.9 URINARY RETENTION: Primary | ICD-10-CM

## 2018-10-08 PROCEDURE — 99213 OFFICE O/P EST LOW 20 MIN: CPT | Mod: 25 | Performed by: UROLOGY

## 2018-10-08 PROCEDURE — 51798 US URINE CAPACITY MEASURE: CPT | Performed by: UROLOGY

## 2018-10-08 NOTE — NURSING NOTE
Active order to obtain bladder scan? Yes   Name of ordering provider:  Vinny   Bladder scan preformed post void Yes.  Bladder scan reveled 55ML  Provider notified?  Yes    Nohemy VALDEZ CMA

## 2018-10-08 NOTE — MR AVS SNAPSHOT
"              After Visit Summary   10/8/2018    Karlos Wood    MRN: [id number]           Patient Information     Date Of Birth          1945        Visit Information        Provider Department      10/8/2018 1:00 PM MERY Casillas MD [facility]        Today's Diagnoses     Urinary retention    -  1    Bladder outlet obstruction          Care Instructions    Per Physician's instructions            Follow-ups after your visit        Your next 10 appointments already scheduled     Nov 20, 2018 10:00 AM CST   New Visit with Sarah Patton PA-C   [facility] ([facility])    [address] [id number] [phone]              Who to contact     If you have questions or need follow up information about today's clinic visit or your schedule please contact [facility] directly at [phone].  Normal or non-critical lab and imaging results will be communicated to you by MyChart, letter or phone within 4 business days after the clinic has received the results. If you do not hear from us within 7 days, please contact the clinic through MyChart or phone. If you have a critical or abnormal lab result, we will notify you by phone as soon as possible.  Submit refill requests through [facility] or call your pharmacy and they will forward the refill request to us. Please allow 3 business days for your refill to be completed.          Additional Information About Your Visit        Care EveryWhere ID     This is your Care EveryWhere ID. This could be used by other organizations to access your [place] medical records  [id number]        Your Vitals Were     Pulse Temperature Respirations Height BMI (Body Mass Index)       61 97.1  F (36.2  C) (Tympanic) 18 1.74 m (5' 8.5\") 28.02 kg/m2        Blood Pressure from Last 3 Encounters:   10/08/18 125/72   09/07/18 133/76   06/06/18 130/76    Weight from Last 3 Encounters:   10/08/18 84.8 kg " (187 lb)   09/07/18 84.8 kg (187 lb)   06/06/18 85.7 kg (189 lb)              We Performed the Following     MEASURE POST-VOID RESIDUAL URINE/BLADDER CAPACITY, US NON-IMAGING        Primary Care Provider Office Phone # Fax #    Terence Thornton [phone] [address] [facility] [address]        Equal Access to Services     [facility] : Hadii aad ku hadasho Soomaali, waaxda luqadaha, qaybta kaalmada adeegyada, waxay idiin hayaan adeeg khsherif laeulogion . So [facility] [phone].    ATENCIÓN: Si domo contreras, tiene a arora disposición servicios gratuitos de asistencia lingüística. Llame al [phone].    We comply with applicable federal civil rights laws and [place] laws. We do not discriminate on the basis of race, color, national origin, age, disability, sex, sexual orientation, or gender identity.            Thank you!     Thank you for choosing [facility]  for your care. Our goal is always to provide you with excellent care. Hearing back from our patients is one way we can continue to improve our services. Please take a few minutes to complete the written survey that you may receive in the mail after your visit with us. Thank you!             Your Updated Medication List - Protect others around you: Learn how to safely use, store and throw away your medicines at www.disposemymeds.org.          This list is accurate as of 10/8/18 11:59 PM.  Always use your most recent med list.                   Brand Name Dispense Instructions for use Diagnosis    BENADRYL 25 MG tablet   Generic drug:  diphenhydrAMINE     56    1 TABLET EVERY 4 TO 6 HOURS AS NEEDED        CIALIS 10 MG tablet   Generic drug:  tadalafil      Take by mouth daily as needed for erectile dysfunction        diflorasone 0.05 % ointment    PSORCON    30 g    Apply to Leg eczema once daily as needed.    Eczema       fluocinonide 0.05 % cream    LIDEX    30 g    Apply sparingly to affected area twice daily for 14  days.  Do not apply to face.    Contact dermatitis due to poison ivy       hydrocortisone 2.5 % cream    ANUSOL-HC    30 g    Place rectally 2 times daily    External hemorrhoids       hydrOXYzine 25 MG tablet    ATARAX    60 tablet    Take 1-2 tablets (25-50 mg) by mouth every 6 hours as needed for itching    Rash       IBUPROFEN PO      Take 400 mg by mouth every 6 hours as needed for moderate pain        MICONAZOLE      applying to toes PRN        predniSONE 20 MG tablet    DELTASONE    20 tablet    Take 3 tabs (60 mg) by mouth daily x 3 days, 2 tabs (40 mg) daily x 3 days, 1 tab (20 mg) daily x 3 days, then 1/2 tab (10 mg) x 3 days.    Contact dermatitis due to poison ivy       simvastatin 20 MG tablet    ZOCOR    90 tablet    Take 1 tablet (20 mg) by mouth At Bedtime    Hyperlipidemia LDL goal <130       * tamsulosin 0.4 MG capsule    FLOMAX     Take 0.4 mg by mouth        * tamsulosin 0.4 MG capsule    FLOMAX    90 capsule    Take 1 capsule (0.4 mg) by mouth daily    Urinary retention       TYLENOL PO      Take 500 mg by mouth        * Notice:  This list has 2 medication(s) that are the same as other medications prescribed for you. Read the directions carefully, and ask your doctor or other care provider to review them with you.

## 2018-10-08 NOTE — NURSING NOTE
"Chief Complaint   Patient presents with     RECHECK     Urinary Retention        Initial /72 (BP Location: Right arm, Patient Position: Chair, Cuff Size: Adult Regular)  Pulse 61  Temp 97.1  F (36.2  C) (Tympanic)  Resp 18  Ht 1.74 m (5' 8.5\")  Wt 84.8 kg (187 lb)  BMI 28.02 kg/m2 Estimated body mass index is 28.02 kg/(m^2) as calculated from the following:    Height as of this encounter: 1.74 m (5' 8.5\").    Weight as of this encounter: 84.8 kg (187 lb).  BP completed using cuff size: regular   Medications and allergies reviewed.      Nohemy VALDEZ CMA     "

## 2018-10-09 NOTE — PROGRESS NOTES
Appointment source: Established Patient  Patient name: Karlos Wood  Urology Staff: Ryan Casillas MD    Subjective: This is a 72 year old year old male returning for follow up of urinary retention    Objective:  Doing well. No significant voiding complaints. Is again considering a discontinuation of his tamsulosin. He did not stop previously.    Suggested that he continue the tamsulosin indefinitely but if he did choose to stop then restarting is not a problem if the quality of urination declined.    Prostate benign to palpation.    Assessment:  Symptoms of bladder outlet obstruction resolved on tamsulosin    Plan:  Continue tamsulosin but discontinuation can be considered if so desired.    Total time 20 minutes, counseling 15 minutes discussing bladder outlet obstruction.

## 2018-11-20 ENCOUNTER — OFFICE VISIT (OUTPATIENT)
Dept: DERMATOLOGY | Facility: CLINIC | Age: 73
End: 2018-11-20
Payer: COMMERCIAL

## 2018-11-20 VITALS — HEART RATE: 60 BPM | DIASTOLIC BLOOD PRESSURE: 70 MMHG | SYSTOLIC BLOOD PRESSURE: 139 MMHG | OXYGEN SATURATION: 99 %

## 2018-11-20 DIAGNOSIS — D48.5 NEOPLASM OF UNCERTAIN BEHAVIOR OF SKIN: Primary | ICD-10-CM

## 2018-11-20 DIAGNOSIS — L81.4 LENTIGO: ICD-10-CM

## 2018-11-20 DIAGNOSIS — Z85.828 HISTORY OF BASAL CELL CARCINOMA: ICD-10-CM

## 2018-11-20 DIAGNOSIS — D18.01 CHERRY ANGIOMA: ICD-10-CM

## 2018-11-20 DIAGNOSIS — L82.1 SEBORRHEIC KERATOSIS: ICD-10-CM

## 2018-11-20 DIAGNOSIS — L82.0 INFLAMED SEBORRHEIC KERATOSIS: ICD-10-CM

## 2018-11-20 DIAGNOSIS — L73.8 SEBACEOUS HYPERPLASIA: ICD-10-CM

## 2018-11-20 PROCEDURE — 17110 DESTRUCTION B9 LES UP TO 14: CPT | Mod: 51 | Performed by: PHYSICIAN ASSISTANT

## 2018-11-20 PROCEDURE — 99213 OFFICE O/P EST LOW 20 MIN: CPT | Mod: 25 | Performed by: PHYSICIAN ASSISTANT

## 2018-11-20 PROCEDURE — 88305 TISSUE EXAM BY PATHOLOGIST: CPT | Mod: TC | Performed by: PHYSICIAN ASSISTANT

## 2018-11-20 PROCEDURE — 11100 HC BIOPSY SKIN/SUBQ/MUC MEM, SINGLE LESION: CPT | Mod: 59 | Performed by: PHYSICIAN ASSISTANT

## 2018-11-20 NOTE — LETTER
11/20/2018         RE: Karlos Wood  [address] [address]        Dear Colleague,    Thank you for referring your patient, Karlos Wood, to the [facility]. Please see a copy of my visit note below.    Karlos Wood is a 72 year old year old male patient here today for upper body skin check. Patient notes a spot on nose that has been growing in size. He denies any pain or bleeding.  Patient has no other skin complaints today.  Remainder of the HPI, Meds, PMH, Allergies, FH, and SH was reviewed in chart.    Pertinent Hx:  History of BCC  Past Medical History:   Diagnosis Date     Basal cell carcinoma      Elevated prostate specific antigen (PSA)     High Prostate     Other affections of shoulder region, not elsewhere classified     Right Shoulder Impingement     Other chronic sinusitis     Chronic Sinusitis     Pure hypercholesterolemia     High Chol     Varicose veins of legs, with delivery, with or without mention of antepartum condition     Varicose Vein       Past Surgical History:   Procedure Laterality Date     COLONOSCOPY  5/9/2007     FLEXIBLE SIGMOIDOSCOPY  3/27/2001    Flexible Sigmoidoscopy to 55 cm.     HEMORRHOIDECTOMY INTERNAL N/A 3/7/2018    Procedure: HEMORRHOIDECTOMY INTERNAL;  Examination Under Anesthesia & Internal  Hemorrhoidectomy;  Surgeon: Justo Otto MD;  Location: [facility] OR     OSTEOTOMY FOOT  3/27/2014    Procedure: OSTEOTOMY FOOT;  Left Foot 1st Metatarsal Corrective Osteotomy, 3rd Toe Correction;  Surgeon: Delroy Teixeira DPM;  Location: [facility] OR     SURGICAL HISTORY OF -   12/12/2002    Arthroscopic subacromial decompression     SURGICAL HISTORY OF -   1982    Right Ankle Repair for bone chips     SURGICAL HISTORY OF -   3/2009    lipoma on back        Family History   Problem Relation Age of Onset     Cancer Mother      lung     Cancer Father      lung     Breast Cancer Sister      Asthma No family hx of      C.A.D. No family hx of       Diabetes No family hx of      Hypertension No family hx of      Cerebrovascular Disease No family hx of      Cancer - colorectal No family hx of      Prostate Cancer No family hx of        Social History     Social History     Marital status:      Spouse name: N/A     Number of children: N/A     Years of education: N/A     Occupational History     Not on file.     Social History Main Topics     Smoking status: Former Smoker     Smokeless tobacco: Never Used     Alcohol use Yes     Drug use: No     Sexual activity: Yes     Partners: Female     Other Topics Concern     Parent/Sibling W/ Cabg, Mi Or Angioplasty Before 65f 55m? No     Social History Narrative       Outpatient Encounter Prescriptions as of 11/20/2018   Medication Sig Dispense Refill     Acetaminophen (TYLENOL PO) Take 500 mg by mouth       BENADRYL 25 MG OR TABS 1 TABLET EVERY 4 TO 6 HOURS AS NEEDED 56 0     diflorasone (PSORCON) 0.05 % ointment Apply to Leg eczema once daily as needed. 30 g 3     fluocinonide (LIDEX) 0.05 % cream Apply sparingly to affected area twice daily for 14 days.  Do not apply to face. 30 g 0     hydrocortisone (ANUSOL-HC) 2.5 % cream Place rectally 2 times daily 30 g 0     hydrOXYzine (ATARAX) 25 MG tablet Take 1-2 tablets (25-50 mg) by mouth every 6 hours as needed for itching 60 tablet 1     IBUPROFEN PO Take 400 mg by mouth every 6 hours as needed for moderate pain       MICONAZOLE applying to toes PRN       predniSONE (DELTASONE) 20 MG tablet Take 3 tabs (60 mg) by mouth daily x 3 days, 2 tabs (40 mg) daily x 3 days, 1 tab (20 mg) daily x 3 days, then 1/2 tab (10 mg) x 3 days. 20 tablet 0     simvastatin (ZOCOR) 20 MG tablet Take 1 tablet (20 mg) by mouth At Bedtime 90 tablet 3     tadalafil (CIALIS) 10 MG tablet Take by mouth daily as needed for erectile dysfunction       tamsulosin (FLOMAX) 0.4 MG capsule Take 1 capsule (0.4 mg) by mouth daily 90 capsule 1     tamsulosin (FLOMAX) 0.4 MG capsule Take 0.4 mg by  mouth       No facility-administered encounter medications on file as of 11/20/2018.              Review Of Systems  Skin: As above  Eyes: negative  Ears/Nose/Throat: negative  Respiratory: No shortness of breath, dyspnea on exertion, cough, or hemoptysis  Cardiovascular: negative  Gastrointestinal: negative  Genitourinary: negative  Musculoskeletal: negative  Neurologic: negative  Psychiatric: negative  Hematologic/Lymphatic/Immunologic: negative  Endocrine: negative      O:   NAD, WDWN, Alert & Oriented, Mood & Affect wnl, Vitals stable   Here today alone   /70 (BP Location: Right arm, Patient Position: Sitting, Cuff Size: Adult Large)  Pulse 60  SpO2 99%   General appearance normal   Vitals stable   Alert, oriented and in no acute distress     0.4 yellow lobulated papule on left nasal sidewall  Stuck on papules and brown macules on trunk and ext   Red papules on trunk  Yellow lobulated papules on forehead    The remainder of expanded problem focused exam was unremarkable; the following areas were examined:  scalp/hair, conjunctiva/lids, face, neck, lips/teeth, chest, digits/nails, RUE, LUE.      Eyes: Conjunctivae/lids:Normal     ENT: Lips: normal    MSK:Normal    Pulm: Breathing Normal     Neuro/Psych: Orientation:Normal; Mood/Affect:Normal  A/P:  1. R/O sebaceous hyperplasia on left nasal sidewall  TANGENTIAL BIOPSY SENT OUT:  After consent, anesthesia with LEC and prep, tangential excision performed and specimen sent out for permanent section histology.  No complications and routine wound care. Patient told to call our office in 1-2 weeks for result.      2. Inflamed seborrheic keratoses on back x 4 and right wrist x 1  LN2:  Treated with LN2 for 5s for 1-2 cycles. Warned risks of blistering, pain, pigment change, scarring, and incomplete resolution.  Advised patient to return if lesions do not completely resolve.  Wound care sheet given.  3. History of BCC, Seborrheic keratosis, lentigo, sebaceous  hyperplasia   BENIGN LESIONS DISCUSSED WITH PATIENT:  I discussed the specifics of tumor, prognosis, and genetics of benign lesions.  I explained that treatment of these lesions would be purely cosmetic and not medically neccessary.  I discussed with patient different removal options including excision, cautery and /or laser.      Nature and genetics of benign skin lesions dicussed with patient.  Signs and Symptoms of skin cancer discussed with patient.  ABCDEs of melanoma reviewed with patient.  Patient encouraged to perform monthly skin exams.  UV precautions reviewed with patient.  Risks of non-melanoma skin cancer discussed with patient   Return to clinic one year or sooner if needed pending biopsy results.         Again, thank you for allowing me to participate in the care of your patient.        Sincerely,        Sarah Herndon PA-C

## 2018-11-20 NOTE — MR AVS SNAPSHOT
After Visit Summary   11/20/2018    Karlos Wood    MRN: [id number]           Patient Information     Date Of Birth          1945        Visit Information        Provider Department      11/20/2018 10:00 AM Sarah Patton PA-C [facility]        Today's Diagnoses     Neoplasm of uncertain behavior of skin    -  1      Care Instructions    Wound Care Instructions     FOR SUPERFICIAL WOUNDS     [facility] [phone]    [facility] [phone]    Left side of nose    AFTER 24 HOURS YOU SHOULD REMOVE THE BANDAGE AND BEGIN DAILY DRESSING CHANGES AS FOLLOWS:     1) Remove Dressing.     2) Clean and dry the area with tap water using a Q-tip or sterile gauze pad.     3) Apply Vaseline, Aquaphor, Polysporin ointment or Bacitracin ointment over entire wound.  Do NOT use Neosporin ointment.     4) Cover the wound with a band-aid, or a sterile non-stick gauze pad and micropore paper tape      REPEAT THESE INSTRUCTIONS AT LEAST ONCE A DAY UNTIL THE WOUND HAS COMPLETELY HEALED.    It is an old wives tale that a wound heals better when it is exposed to air and allowed to dry out. The wound will heal faster with a better cosmetic result if it is kept moist with ointment and covered with a bandage.    **Do not let the wound dry out.**      Supplies Needed:      *Cotton tipped applicators (Q-tips)    *Polysporin Ointment or Bacitracin Ointment (NOT NEOSPORIN)    *Band-aids or non-stick gauze pads and micropore paper tape.      PATIENT INFORMATION:    During the healing process you will notice a number of changes. All wounds develop a small halo of redness surrounding the wound.  This means healing is occurring. Severe itching with extensive redness usually indicates sensitivity to the ointment or bandage tape used to dress the wound.  You should call our office if this develops.      Swelling  and/or discoloration around your surgical site is common,  particularly when performed around the eye.    All wounds normally drain.  The larger the wound the more drainage there will be.  After 7-10 days, you will notice the wound beginning to shrink and new skin will begin to grow.  The wound is healed when you can see skin has formed over the entire area.  A healed wound has a healthy, shiny look to the surface and is red to dark pink in color to normalize.  Wounds may take approximately 4-6 weeks to heal.  Larger wounds may take 6-8 weeks.  After the wound is healed you may discontinue dressing changes.    You may experience a sensation of tightness as your wound heals. This is normal and will gradually subside.    Your healed wound may be sensitive to temperature changes. This sensitivity improves with time, but if you re having a lot of discomfort, try to avoid temperature extremes.    Patients frequently experience itching after their wound appears to have healed because of the continue healing under the skin.  Plain Vaseline will help relieve the itching.        POSSIBLE COMPLICATIONS    BLEEDIN. Leave the bandage in place.  2. Use tightly rolled up gauze or a cloth to apply direct pressure over the bandage for 30  minutes.  3. Reapply pressure for an additional 30 minutes if necessary  4. Use additional gauze and tape to maintain pressure once the bleeding has stopped.    WOUND CARE INSTRUCTIONS   FOR CRYOSURGERY   This area treated with liquid nitrogen should form a blister (areas treated may or may not blister-skin may just turn dark and slough off). You do not need to bandage the area unless a blister forms and breaks (which may be a few days). When the blister breaks, begin daily dressing changes as follows:  1) Clean and dry the area with tap water using clean Q-tip or sterile gauze pad.   2) Apply Polysporin ointment or Bacitracin ointment over entire wound. Do NOT use Neosporin ointment.   3) Cover the wound with a band-aid or sterile non-stick gauze  pad and micropore paper tape.   REPEAT THESE INSTRUCTIONS AT LEAST ONCE A DAY UNTIL THE WOUND HAS COMPLETELY HEALED.   It is an old wives tale that a wound heals better when it is exposed to air and allowed to dry out. The wound will heal faster with a better cosmetic result if it is kept moist with ointment and covered with a bandage.   Do not let the wound dry out.   IMPORTANT INFORMATION ON REVERSE SIDE   Supplies Needed:   *Cotton tipped applicators (Q-tips)   *Polysporin ointment or Bacitracin ointment (NOT NEOSPORIN)   *Band-aids, or non stick gauze pads and micropore paper tape   PATIENT INFORMATION   During the healing process you will notice a number of changes. All wounds develop a small halo of redness surrounding the wound. This means healing is occurring. Severe itching with extensive redness usually indicates sensitivity to the ointment or bandage tape used to dress the wound. You should call our office if this develops.   Swelling and/or discoloration around your surgical site is common, particularly when performed around the eye.   All wounds normally drain. The larger the wound the more drainage there will be. After 7-10 days, you will notice the wound beginning to shrink and new skin will begin to grow. The wound is healed when you can see skin has formed over the entire area. A healed wound has a healthy, shiny look to the surface and is red to dark pink in color to normalize. Wounds may take approximately 4-6 weeks to heal. Larger wounds may take 6-8 weeks. After the wound is healed you may discontinue dressing changes.   You may experience a sensation of tightness as your wound heals. This is normal and will gradually subside.   Your healed wound may be sensitive to temperature changes. This sensitivity improves with time, but if you re having a lot of discomfort, try to avoid temperature extremes.   Patients frequently experience itching after their wound appears to have healed because of the  continue healing under the skin. Plain Vaseline will help relieve the itching.     We will notify you of your biopsy results in 7-10 business days.             Follow-ups after your visit        Who to contact     If you have questions or need follow up information about today's clinic visit or your schedule please contact [facility] directly at [phone].  Normal or non-critical lab and imaging results will be communicated to you by MyChart, letter or phone within 4 business days after the clinic has received the results. If you do not hear from us within 7 days, please contact the clinic through MyChart or phone. If you have a critical or abnormal lab result, we will notify you by phone as soon as possible.  Submit refill requests through [employer] or call your pharmacy and they will forward the refill request to us. Please allow 3 business days for your refill to be completed.          Additional Information About Your Visit        Care EveryWhere ID     This is your Care EveryWhere ID. This could be used by other organizations to access your [place] medical records  [id number]        Your Vitals Were     Pulse Pulse Oximetry                60 99%           Blood Pressure from Last 3 Encounters:   11/20/18 139/70   10/08/18 125/72   09/07/18 133/76    Weight from Last 3 Encounters:   10/08/18 84.8 kg (187 lb)   09/07/18 84.8 kg (187 lb)   06/06/18 85.7 kg (189 lb)              We Performed the Following     BIOPSY SKIN/SUBQ/MUC MEM, SINGLE LESION     Dermatological path order and indications        Primary Care Provider Office Phone # Fax #    Terence Thornton [phone] [address] [address]        Equal Access to Services     [facility]: Hadii lizbeth Polanco, waalmita kasper, qaybta neetaalnaomi chester. So [facility] [place] [facility] [phone].    ATENCIÓN: Si habla español, tiene a arora disposición servicios gratuitos de  janice lingüísticrahul. Dennis al [phone].    We comply with applicable federal civil rights laws and [place] laws. We do not discriminate on the basis of race, color, national origin, age, disability, sex, sexual orientation, or gender identity.            Thank you!     Thank you for choosing [facility]  for your care. Our goal is always to provide you with excellent care. Hearing back from our patients is one way we can continue to improve our services. Please take a few minutes to complete the written survey that you may receive in the mail after your visit with us. Thank you!             Your Updated Medication List - Protect others around you: Learn how to safely use, store and throw away your medicines at www.disposemymeds.org.          This list is accurate as of 11/20/18 10:22 AM.  Always use your most recent med list.                   Brand Name Dispense Instructions for use Diagnosis    BENADRYL 25 MG tablet   Generic drug:  diphenhydrAMINE     56    1 TABLET EVERY 4 TO 6 HOURS AS NEEDED        CIALIS 10 MG tablet   Generic drug:  tadalafil      Take by mouth daily as needed for erectile dysfunction        diflorasone 0.05 % ointment    PSORCON    30 g    Apply to Leg eczema once daily as needed.    Eczema       fluocinonide 0.05 % cream    LIDEX    30 g    Apply sparingly to affected area twice daily for 14 days.  Do not apply to face.    Contact dermatitis due to poison ivy       hydrocortisone 2.5 % cream    ANUSOL-HC    30 g    Place rectally 2 times daily    External hemorrhoids       hydrOXYzine 25 MG tablet    ATARAX    60 tablet    Take 1-2 tablets (25-50 mg) by mouth every 6 hours as needed for itching    Rash       IBUPROFEN PO      Take 400 mg by mouth every 6 hours as needed for moderate pain        MICONAZOLE      applying to toes PRN        predniSONE 20 MG tablet    DELTASONE    20 tablet    Take 3 tabs (60 mg) by mouth daily x 3 days, 2 tabs (40 mg) daily x 3 days, 1  tab (20 mg) daily x 3 days, then 1/2 tab (10 mg) x 3 days.    Contact dermatitis due to poison ivy       simvastatin 20 MG tablet    ZOCOR    90 tablet    Take 1 tablet (20 mg) by mouth At Bedtime    Hyperlipidemia LDL goal <130       * tamsulosin 0.4 MG capsule    FLOMAX     Take 0.4 mg by mouth        * tamsulosin 0.4 MG capsule    FLOMAX    90 capsule    Take 1 capsule (0.4 mg) by mouth daily    Urinary retention       TYLENOL PO      Take 500 mg by mouth        * Notice:  This list has 2 medication(s) that are the same as other medications prescribed for you. Read the directions carefully, and ask your doctor or other care provider to review them with you.

## 2018-11-20 NOTE — NURSING NOTE
"Initial /70 (BP Location: Right arm, Patient Position: Sitting, Cuff Size: Adult Large)  Pulse 60  SpO2 99% Estimated body mass index is 28.02 kg/(m^2) as calculated from the following:    Height as of 10/8/18: 1.74 m (5' 8.5\").    Weight as of 10/8/18: 84.8 kg (187 lb). .      "

## 2018-11-20 NOTE — PATIENT INSTRUCTIONS
Wound Care Instructions     FOR SUPERFICIAL WOUNDS     [facility] [phone]    [facility] [phone]    Left side of nose    AFTER 24 HOURS YOU SHOULD REMOVE THE BANDAGE AND BEGIN DAILY DRESSING CHANGES AS FOLLOWS:     1) Remove Dressing.     2) Clean and dry the area with tap water using a Q-tip or sterile gauze pad.     3) Apply Vaseline, Aquaphor, Polysporin ointment or Bacitracin ointment over entire wound.  Do NOT use Neosporin ointment.     4) Cover the wound with a band-aid, or a sterile non-stick gauze pad and micropore paper tape      REPEAT THESE INSTRUCTIONS AT LEAST ONCE A DAY UNTIL THE WOUND HAS COMPLETELY HEALED.    It is an old wives tale that a wound heals better when it is exposed to air and allowed to dry out. The wound will heal faster with a better cosmetic result if it is kept moist with ointment and covered with a bandage.    **Do not let the wound dry out.**      Supplies Needed:      *Cotton tipped applicators (Q-tips)    *Polysporin Ointment or Bacitracin Ointment (NOT NEOSPORIN)    *Band-aids or non-stick gauze pads and micropore paper tape.      PATIENT INFORMATION:    During the healing process you will notice a number of changes. All wounds develop a small halo of redness surrounding the wound.  This means healing is occurring. Severe itching with extensive redness usually indicates sensitivity to the ointment or bandage tape used to dress the wound.  You should call our office if this develops.      Swelling  and/or discoloration around your surgical site is common, particularly when performed around the eye.    All wounds normally drain.  The larger the wound the more drainage there will be.  After 7-10 days, you will notice the wound beginning to shrink and new skin will begin to grow.  The wound is healed when you can see skin has formed over the entire area.  A healed wound has a healthy, shiny look to the surface and is red to dark pink in color to  normalize.  Wounds may take approximately 4-6 weeks to heal.  Larger wounds may take 6-8 weeks.  After the wound is healed you may discontinue dressing changes.    You may experience a sensation of tightness as your wound heals. This is normal and will gradually subside.    Your healed wound may be sensitive to temperature changes. This sensitivity improves with time, but if you re having a lot of discomfort, try to avoid temperature extremes.    Patients frequently experience itching after their wound appears to have healed because of the continue healing under the skin.  Plain Vaseline will help relieve the itching.        POSSIBLE COMPLICATIONS    BLEEDIN. Leave the bandage in place.  2. Use tightly rolled up gauze or a cloth to apply direct pressure over the bandage for 30  minutes.  3. Reapply pressure for an additional 30 minutes if necessary  4. Use additional gauze and tape to maintain pressure once the bleeding has stopped.    WOUND CARE INSTRUCTIONS   FOR CRYOSURGERY   This area treated with liquid nitrogen should form a blister (areas treated may or may not blister-skin may just turn dark and slough off). You do not need to bandage the area unless a blister forms and breaks (which may be a few days). When the blister breaks, begin daily dressing changes as follows:  1) Clean and dry the area with tap water using clean Q-tip or sterile gauze pad.   2) Apply Polysporin ointment or Bacitracin ointment over entire wound. Do NOT use Neosporin ointment.   3) Cover the wound with a band-aid or sterile non-stick gauze pad and micropore paper tape.   REPEAT THESE INSTRUCTIONS AT LEAST ONCE A DAY UNTIL THE WOUND HAS COMPLETELY HEALED.   It is an old wives tale that a wound heals better when it is exposed to air and allowed to dry out. The wound will heal faster with a better cosmetic result if it is kept moist with ointment and covered with a bandage.   Do not let the wound dry out.   IMPORTANT INFORMATION  ON REVERSE SIDE   Supplies Needed:   *Cotton tipped applicators (Q-tips)   *Polysporin ointment or Bacitracin ointment (NOT NEOSPORIN)   *Band-aids, or non stick gauze pads and micropore paper tape   PATIENT INFORMATION   During the healing process you will notice a number of changes. All wounds develop a small halo of redness surrounding the wound. This means healing is occurring. Severe itching with extensive redness usually indicates sensitivity to the ointment or bandage tape used to dress the wound. You should call our office if this develops.   Swelling and/or discoloration around your surgical site is common, particularly when performed around the eye.   All wounds normally drain. The larger the wound the more drainage there will be. After 7-10 days, you will notice the wound beginning to shrink and new skin will begin to grow. The wound is healed when you can see skin has formed over the entire area. A healed wound has a healthy, shiny look to the surface and is red to dark pink in color to normalize. Wounds may take approximately 4-6 weeks to heal. Larger wounds may take 6-8 weeks. After the wound is healed you may discontinue dressing changes.   You may experience a sensation of tightness as your wound heals. This is normal and will gradually subside.   Your healed wound may be sensitive to temperature changes. This sensitivity improves with time, but if you re having a lot of discomfort, try to avoid temperature extremes.   Patients frequently experience itching after their wound appears to have healed because of the continue healing under the skin. Plain Vaseline will help relieve the itching.     We will notify you of your biopsy results in 7-10 business days.

## 2018-11-20 NOTE — PROGRESS NOTES
Karlos Wood is a 72 year old year old male patient here today for upper body skin check. Patient notes a spot on nose that has been growing in size. He denies any pain or bleeding.  Patient has no other skin complaints today.  Remainder of the HPI, Meds, PMH, Allergies, FH, and SH was reviewed in chart.    Pertinent Hx:  History of BCC  Past Medical History:   Diagnosis Date     Basal cell carcinoma      Elevated prostate specific antigen (PSA)     High Prostate     Other affections of shoulder region, not elsewhere classified     Right Shoulder Impingement     Other chronic sinusitis     Chronic Sinusitis     Pure hypercholesterolemia     High Chol     Varicose veins of legs, with delivery, with or without mention of antepartum condition     Varicose Vein       Past Surgical History:   Procedure Laterality Date     COLONOSCOPY  5/9/2007     FLEXIBLE SIGMOIDOSCOPY  3/27/2001    Flexible Sigmoidoscopy to 55 cm.     HEMORRHOIDECTOMY INTERNAL N/A 3/7/2018    Procedure: HEMORRHOIDECTOMY INTERNAL;  Examination Under Anesthesia & Internal  Hemorrhoidectomy;  Surgeon: Justo Otto MD;  Location: [facility]     OSTEOTOMY FOOT  3/27/2014    Procedure: OSTEOTOMY FOOT;  Left Foot 1st Metatarsal Corrective Osteotomy, 3rd Toe Correction;  Surgeon: Delroy Teixeira DPM;  Location: [facility] OR     SURGICAL HISTORY OF -   12/12/2002    Arthroscopic subacromial decompression     SURGICAL HISTORY OF -   1982    Right Ankle Repair for bone chips     SURGICAL HISTORY OF -   3/2009    lipoma on back        Family History   Problem Relation Age of Onset     Cancer Mother      lung     Cancer Father      lung     Breast Cancer Sister      Asthma No family hx of      C.A.D. No family hx of      Diabetes No family hx of      Hypertension No family hx of      Cerebrovascular Disease No family hx of      Cancer - colorectal No family hx of      Prostate Cancer No family hx of        Social History     Social History     Marital  status:      Spouse name: N/A     Number of children: N/A     Years of education: N/A     Occupational History     Not on file.     Social History Main Topics     Smoking status: Former Smoker     Smokeless tobacco: Never Used     Alcohol use Yes     Drug use: No     Sexual activity: Yes     Partners: Female     Other Topics Concern     Parent/Sibling W/ Cabg, Mi Or Angioplasty Before 65f 55m? No     Social History Narrative       Outpatient Encounter Prescriptions as of 11/20/2018   Medication Sig Dispense Refill     Acetaminophen (TYLENOL PO) Take 500 mg by mouth       BENADRYL 25 MG OR TABS 1 TABLET EVERY 4 TO 6 HOURS AS NEEDED 56 0     diflorasone (PSORCON) 0.05 % ointment Apply to Leg eczema once daily as needed. 30 g 3     fluocinonide (LIDEX) 0.05 % cream Apply sparingly to affected area twice daily for 14 days.  Do not apply to face. 30 g 0     hydrocortisone (ANUSOL-HC) 2.5 % cream Place rectally 2 times daily 30 g 0     hydrOXYzine (ATARAX) 25 MG tablet Take 1-2 tablets (25-50 mg) by mouth every 6 hours as needed for itching 60 tablet 1     IBUPROFEN PO Take 400 mg by mouth every 6 hours as needed for moderate pain       MICONAZOLE applying to toes PRN       predniSONE (DELTASONE) 20 MG tablet Take 3 tabs (60 mg) by mouth daily x 3 days, 2 tabs (40 mg) daily x 3 days, 1 tab (20 mg) daily x 3 days, then 1/2 tab (10 mg) x 3 days. 20 tablet 0     simvastatin (ZOCOR) 20 MG tablet Take 1 tablet (20 mg) by mouth At Bedtime 90 tablet 3     tadalafil (CIALIS) 10 MG tablet Take by mouth daily as needed for erectile dysfunction       tamsulosin (FLOMAX) 0.4 MG capsule Take 1 capsule (0.4 mg) by mouth daily 90 capsule 1     tamsulosin (FLOMAX) 0.4 MG capsule Take 0.4 mg by mouth       No facility-administered encounter medications on file as of 11/20/2018.              Review Of Systems  Skin: As above  Eyes: negative  Ears/Nose/Throat: negative  Respiratory: No shortness of breath, dyspnea on exertion, cough,  or hemoptysis  Cardiovascular: negative  Gastrointestinal: negative  Genitourinary: negative  Musculoskeletal: negative  Neurologic: negative  Psychiatric: negative  Hematologic/Lymphatic/Immunologic: negative  Endocrine: negative      O:   NAD, WDWN, Alert & Oriented, Mood & Affect wnl, Vitals stable   Here today alone   /70 (BP Location: Right arm, Patient Position: Sitting, Cuff Size: Adult Large)  Pulse 60  SpO2 99%   General appearance normal   Vitals stable   Alert, oriented and in no acute distress     0.4 yellow lobulated papule on left nasal sidewall  Stuck on papules and brown macules on trunk and ext   Red papules on trunk  Yellow lobulated papules on forehead    The remainder of expanded problem focused exam was unremarkable; the following areas were examined:  scalp/hair, conjunctiva/lids, face, neck, lips/teeth, chest, digits/nails, RUE, LUE.      Eyes: Conjunctivae/lids:Normal     ENT: Lips: normal    MSK:Normal    Pulm: Breathing Normal     Neuro/Psych: Orientation:Normal; Mood/Affect:Normal  A/P:  1. R/O sebaceous hyperplasia on left nasal sidewall  TANGENTIAL BIOPSY SENT OUT:  After consent, anesthesia with LEC and prep, tangential excision performed and specimen sent out for permanent section histology.  No complications and routine wound care. Patient told to call our office in 1-2 weeks for result.      2. Inflamed seborrheic keratoses on back x 4 and right wrist x 1  LN2:  Treated with LN2 for 5s for 1-2 cycles. Warned risks of blistering, pain, pigment change, scarring, and incomplete resolution.  Advised patient to return if lesions do not completely resolve.  Wound care sheet given.  3. History of BCC, Seborrheic keratosis, lentigo, sebaceous hyperplasia   BENIGN LESIONS DISCUSSED WITH PATIENT:  I discussed the specifics of tumor, prognosis, and genetics of benign lesions.  I explained that treatment of these lesions would be purely cosmetic and not medically neccessary.  I discussed  with patient different removal options including excision, cautery and /or laser.      Nature and genetics of benign skin lesions dicussed with patient.  Signs and Symptoms of skin cancer discussed with patient.  ABCDEs of melanoma reviewed with patient.  Patient encouraged to perform monthly skin exams.  UV precautions reviewed with patient.  Risks of non-melanoma skin cancer discussed with patient   Return to clinic one year or sooner if needed pending biopsy results.

## 2018-11-20 NOTE — LETTER
[facility]  [address] [address]  Phone: [phone]    November 26, 2018    Karlos Wood                                                                                                                [address] [address]            Dear Edwin HarveyNina,    The skin biopsy of your left nasal sidewall returned as sebaceous hyperplasia, enlarged    oil gland. No further treatment is needed.    Thank you,      Sarah IBARRA / [facility]

## 2018-11-23 LAB — COPATH REPORT: NORMAL

## 2018-12-10 ENCOUNTER — TELEPHONE (OUTPATIENT)
Dept: FAMILY MEDICINE | Facility: CLINIC | Age: 73
End: 2018-12-10

## 2018-12-10 DIAGNOSIS — N52.9 ERECTILE DYSFUNCTION, UNSPECIFIED ERECTILE DYSFUNCTION TYPE: Primary | ICD-10-CM

## 2018-12-10 RX ORDER — SILDENAFIL 100 MG/1
100 TABLET, FILM COATED ORAL DAILY PRN
Qty: 90 TABLET | Refills: 0 | Status: SHIPPED | OUTPATIENT
Start: 2018-12-10 | End: 2019-12-10

## 2018-12-10 NOTE — TELEPHONE ENCOUNTER
Reason for Call:  Medication or medication refill:    Do you use a [facility]?  Name of the pharmacy and phone number for the current request:  [facility]    Name of the medication requested: Pt asking for generic Viagra 100mg refill for #90 tabs.  He no longer takes Cialis - Please remove med from list.  No need to call patient back, unless there are questions or problems.      Other request:     Can we leave a detailed message on this number? YES    Phone number patient can be reached at: Home number on file [phone] (home)    Best Time: any    Call taken on 12/10/2018 at 10:13 AM by Shirlene Mayes

## 2019-04-16 ENCOUNTER — OFFICE VISIT (OUTPATIENT)
Dept: FAMILY MEDICINE | Facility: CLINIC | Age: 74
End: 2019-04-16
Payer: COMMERCIAL

## 2019-04-16 VITALS — SYSTOLIC BLOOD PRESSURE: 158 MMHG | DIASTOLIC BLOOD PRESSURE: 80 MMHG | HEART RATE: 72 BPM

## 2019-04-16 DIAGNOSIS — S61.419A HAND LACERATION: Primary | ICD-10-CM

## 2019-04-16 PROCEDURE — 99207 ZZC NO CHARGE NURSE ONLY: CPT

## 2019-04-16 NOTE — PROGRESS NOTES
"S-(situation): Pt presents to clinic accompanied by brother, laceration to rt palm of hand. Pt alert, oriented.    B-(background): Working with skill saw this am just prior to coming to clinic, sustained laceration through cotton gloved hand. Glove shredded.    A-(assessment): Approx 4-5\" long deep jagged lac palm thumb side. Slight bleeding. Wound gaping. No obvious debris.  No visible tendons exposed. Reports numbness rt thumb, able to move thumb. Full feeling/ sensation of fingers.   Hand soaked in tepid water, antibacterial soap. Patted dry. Consult with Dr. Wilson who advises ED for eval, will need to see hand surg due to depth/ severity of laceration. Pressure drsg applied, secured with roll gauze/ tape. /80, HR 72.  Last tetanus 2013.    R-(recommendations): Due to type of insurance, pt will have brother transport to [facility] now. Pt stable at time of discharge. PATRICIA Mcpherson RN    "

## 2019-07-05 NOTE — PROGRESS NOTES
Karlos Wood is a 72 year old year old male patient here today for evaluation and managment of basal cell carcinoma on right brow.  Patient reports the following previous treatments none.  Patient reports the following modifying factors none.  Associated symptoms: none.  Patient has no other skin complaints today.  Remainder of the HPI, Meds, PMH, Allergies, FH, and SH was reviewed in chart.      Past Medical History:   Diagnosis Date     Basal cell carcinoma      Elevated prostate specific antigen (PSA)     High Prostate     Other affections of shoulder region, not elsewhere classified     Right Shoulder Impingement     Other chronic sinusitis     Chronic Sinusitis     Pure hypercholesterolemia     High Chol     Varicose veins of legs, with delivery, with or without mention of antepartum condition     Varicose Vein       Past Surgical History:   Procedure Laterality Date     COLONOSCOPY  5/9/2007     FLEXIBLE SIGMOIDOSCOPY  3/27/2001    Flexible Sigmoidoscopy to 55 cm.     HEMORRHOIDECTOMY INTERNAL N/A 3/7/2018    Procedure: HEMORRHOIDECTOMY INTERNAL;  Examination Under Anesthesia & Internal  Hemorrhoidectomy;  Surgeon: Justo Otto MD;  Location: [facility] OR     OSTEOTOMY FOOT  3/27/2014    Procedure: OSTEOTOMY FOOT;  Left Foot 1st Metatarsal Corrective Osteotomy, 3rd Toe Correction;  Surgeon: Delroy Teixeira DPM;  Location: [facility] OR     SURGICAL HISTORY OF -   12/12/2002    Arthroscopic subacromial decompression     SURGICAL HISTORY OF -   1982    Right Ankle Repair for bone chips     SURGICAL HISTORY OF -   3/2009    lipoma on back        Family History   Problem Relation Age of Onset     CANCER Mother      lung     CANCER Father      lung     Breast Cancer Sister      Asthma No family hx of      C.A.D. No family hx of      DIABETES No family hx of      Hypertension No family hx of      CEREBROVASCULAR DISEASE No family hx of      Cancer - colorectal No family hx of      Prostate Cancer No family hx  Ventricular Rate : 93  Atrial Rate : 93  QRS Duration : 88  Q-T Interval : 368  QTC Calculation(Bezet) : 457  R Axis : -10  T Axis : 36  Diagnosis : Atrial fibrillation  Abnormal ECG  When compared with ECG of 02-JUL-2019 16:20,  Atrial fibrillation has replaced Sinus rhythm  Confirmed by PRABHJOT TAMAYO MD ([id number]) on 7/8/2019 3:20:13 PM   of        Social History     Social History     Marital status:      Spouse name: N/A     Number of children: N/A     Years of education: N/A     Occupational History     Not on file.     Social History Main Topics     Smoking status: Former Smoker     Smokeless tobacco: Never Used     Alcohol use Yes      Comment: 1-2 roberto every other week     Drug use: No     Sexual activity: Yes     Partners: Female     Other Topics Concern     Parent/Sibling W/ Cabg, Mi Or Angioplasty Before 65f 55m? No     Social History Narrative       Outpatient Encounter Prescriptions as of 4/9/2018   Medication Sig Dispense Refill     ciprofloxacin (CIPRO) 500 MG tablet Take 1 tablet (500 mg) by mouth 2 times daily 20 tablet 0     tamsulosin (FLOMAX) 0.4 MG capsule Take 0.4 mg by mouth       docusate sodium (COLACE) 50 MG capsule Take 50 mg by mouth       simvastatin (ZOCOR) 20 MG tablet Take 20 mg by mouth       HYDROcodone-acetaminophen (NORCO) 5-325 MG per tablet Take 1-2 tablets by mouth every 4 hours as needed for other (Moderate to Severe Pain) (Patient not taking: Reported on 3/20/2018) 30 tablet 0     hydrocortisone (ANUSOL-HC) 2.5 % cream Place rectally 2 times daily 30 g 0     hydrOXYzine (ATARAX) 25 MG tablet Take 1-2 tablets (25-50 mg) by mouth every 6 hours as needed for itching (Patient not taking: Reported on 3/19/2018) 60 tablet 1     tadalafil (CIALIS) 10 MG tablet Take by mouth daily as needed for erectile dysfunction       Acetaminophen (TYLENOL PO) Take 500 mg by mouth       IBUPROFEN PO Take 400 mg by mouth every 6 hours as needed for moderate pain       diflorasone (PSORCON) 0.05 % ointment Apply to Leg eczema once daily as needed. 30 g 3     BENADRYL 25 MG OR TABS 1 TABLET EVERY 4 TO 6 HOURS AS NEEDED 56 0     MICONAZOLE applying to toes PRN       No facility-administered encounter medications on file as of 4/9/2018.              Review Of Systems  Skin: As above  Eyes: negative  Ears/Nose/Throat:  negative  Respiratory: No shortness of breath, dyspnea on exertion, cough, or hemoptysis  Cardiovascular: negative  Gastrointestinal: negative  Genitourinary: negative  Musculoskeletal: negative  Neurologic: negative  Psychiatric: negative  Hematologic/Lymphatic/Immunologic: negative  Endocrine: negative      O:   NAD, WDWN, Alert & Oriented, Mood & Affect wnl, Vitals stable   Here today alone   /84  Pulse 61  SpO2 97%   General appearance normal   Vitals stable   Alert, oriented and in no acute distress     R brow 5mm scaly papule       Eyes: Conjunctivae/lids:Normal     ENT: Lips, buccal mucosa, tongue: normal    MSK:Normal    Cardiovascular: peripheral edema none    Pulm: Breathing Normal    Neuro/Psych: Orientation:Normal; Mood/Affect:Normal      A/P:  1. R brow basal cell carcinoma   MOHS:   Location    After PGACAC discussed with patient, decision for Mohs surgery was made. Indication for Mohs was Location. Patient confirmed the site with Dr. Barron.  After anesthesia with LEC, the tumor was excised using standard Mohs technique in 1 stages(s).  CLEAR MARGINS OBTAINED and Final defect size was 1.1 cm.       REPAIR COMPLEX: Because of the tightness of the surrounding skin and Because of the size and full thickness nature of the defect, a complex closure was planned. After LEC anesthesia and prep, Burow's triangles were excised in the relaxed skin tension lines. The wound edges were widely undermined by dissection in the subcutaneous plane until adequate tissue mobility was obtained. Hemostasis was obtained. The wound edges were closed in a layered fashion using Vicryl and Fast Absorbing Plain Gut sutures. Postoperative length was 4.2 cm.   EBL minimal; complications none; wound care routine.  The patient was discharged in good condition and will return in one week for wound evaluation.    BENIGN LESIONS DISCUSSED WITH PATIENT:  I discussed the specifics of tumor, prognosis, and genetics of benign  lesions.  I explained that treatment of these lesions would be purely cosmetic and not medically neccessary.  I discussed with patient different removal options including excision, cautery and /or laser.      Nature and genetics of benign skin lesions dicussed with patient.  Signs and Symptoms of skin cancer discussed with patient.  Patient encouraged to perform monthly skin exams.  UV precautions reviewed with patient.  Patient to follow up with Primary Care provider regarding elevated blood pressure.  Skin care regimen reviewed with patient: Eliminate harsh soaps, i.e. Dial, zest, irsih spring; Mild soaps such as Cetaphil or Dove sensitive skin, avoid hot or cold showers, aggressive use of emollients including vanicream, cetaphil or cerave discussed with patient.    Risks of non-melanoma skin cancer discussed with patient   Return to clinic 6 months

## 2020-12-05 NOTE — PATIENT INSTRUCTIONS
Before Your Surgery      Call your surgeon if there is any change in your health. This includes signs of a cold or flu (such as a sore throat, runny nose, cough, rash or fever).    Do not smoke, drink alcohol or take over the counter medicine (unless your surgeon or primary care doctor tells you to) for the 24 hours before and after surgery.    If you take prescribed drugs: Follow your doctor s orders about which medicines to take and which to stop until after surgery.    Eating and drinking prior to surgery: follow the instructions from your surgeon    Take a shower or bath the night before surgery. Use the soap your surgeon gave you to gently clean your skin. If you do not have soap from your surgeon, use your regular soap. Do not shave or scrub the surgery site.  Wear clean pajamas and have clean sheets on your bed.   
oral

## (undated) DEVICE — DECANTER VIAL 2006S

## (undated) DEVICE — GLOVE PROTEXIS BLUE W/NEU-THERA 6.5  2D73EB65

## (undated) DEVICE — GOWN LG DISP 9515

## (undated) DEVICE — PREP BALL KERLIX ROUND LATEX

## (undated) DEVICE — TUBING SUCTION 12"X1/4" N612

## (undated) DEVICE — SOL WATER IRRIG 1000ML BOTTLE 07139-09

## (undated) DEVICE — TAPE CLOTH ADHESIVE 3" ZONAS

## (undated) DEVICE — DRSG KERLIX FLUFFS X5

## (undated) DEVICE — SUCTION TIP YANKAUER STR K87

## (undated) DEVICE — PACK LAPAROTOMY CUSTOM LAKES

## (undated) DEVICE — BLADE CLIPPER 4406

## (undated) DEVICE — SU VICRYL 3-0 SH 27" UND J416H

## (undated) DEVICE — GLOVE PROTEXIS W/NEU-THERA 6.5  2D73TE65

## (undated) DEVICE — LUBRICATING JELLY 4.25OZ

## (undated) DEVICE — BLADE KNIFE SURG 15 371115

## (undated) DEVICE — PANTIES MESH LG/XLG 2PK 706M2

## (undated) DEVICE — SOL NACL 0.9% IRRIG 1000ML BOTTLE 07138-09

## (undated) DEVICE — SOL BENZOIN SPRAY 4OZ

## (undated) RX ORDER — PROPOFOL 10 MG/ML
INJECTION, EMULSION INTRAVENOUS
Status: DISPENSED
Start: 2018-03-07

## (undated) RX ORDER — GLYCOPYRROLATE 0.2 MG/ML
INJECTION, SOLUTION INTRAMUSCULAR; INTRAVENOUS
Status: DISPENSED
Start: 2018-03-07

## (undated) RX ORDER — DEXAMETHASONE SODIUM PHOSPHATE 4 MG/ML
INJECTION, SOLUTION INTRA-ARTICULAR; INTRALESIONAL; INTRAMUSCULAR; INTRAVENOUS; SOFT TISSUE
Status: DISPENSED
Start: 2018-03-07

## (undated) RX ORDER — EPHEDRINE SULFATE 50 MG/ML
INJECTION, SOLUTION INTRAVENOUS
Status: DISPENSED
Start: 2018-03-07

## (undated) RX ORDER — CLINDAMYCIN PHOSPHATE 900 MG/50ML
INJECTION, SOLUTION INTRAVENOUS
Status: DISPENSED
Start: 2018-03-07

## (undated) RX ORDER — FENTANYL CITRATE 50 UG/ML
INJECTION, SOLUTION INTRAMUSCULAR; INTRAVENOUS
Status: DISPENSED
Start: 2018-03-07

## (undated) RX ORDER — NEOSTIGMINE METHYLSULFATE 1 MG/ML
VIAL (ML) INJECTION
Status: DISPENSED
Start: 2018-03-07

## (undated) RX ORDER — HYDROCODONE BITARTRATE AND ACETAMINOPHEN 5; 325 MG/1; MG/1
TABLET ORAL
Status: DISPENSED
Start: 2018-03-07

## (undated) RX ORDER — ONDANSETRON 2 MG/ML
INJECTION INTRAMUSCULAR; INTRAVENOUS
Status: DISPENSED
Start: 2018-03-07